# Patient Record
Sex: FEMALE | Race: BLACK OR AFRICAN AMERICAN | NOT HISPANIC OR LATINO | Employment: UNEMPLOYED | URBAN - METROPOLITAN AREA
[De-identification: names, ages, dates, MRNs, and addresses within clinical notes are randomized per-mention and may not be internally consistent; named-entity substitution may affect disease eponyms.]

---

## 2024-10-08 ENCOUNTER — HOSPITAL ENCOUNTER (EMERGENCY)
Facility: HOSPITAL | Age: 34
Discharge: HOME | End: 2024-10-08
Attending: STUDENT IN AN ORGANIZED HEALTH CARE EDUCATION/TRAINING PROGRAM
Payer: MEDICAID

## 2024-10-08 VITALS
RESPIRATION RATE: 18 BRPM | HEART RATE: 92 BPM | WEIGHT: 110 LBS | TEMPERATURE: 98 F | SYSTOLIC BLOOD PRESSURE: 110 MMHG | BODY MASS INDEX: 22.18 KG/M2 | OXYGEN SATURATION: 99 % | HEIGHT: 59 IN | DIASTOLIC BLOOD PRESSURE: 63 MMHG

## 2024-10-08 DIAGNOSIS — R42 DIZZINESS: Primary | ICD-10-CM

## 2024-10-08 DIAGNOSIS — R42 VERTIGO: ICD-10-CM

## 2024-10-08 DIAGNOSIS — N30.00 ACUTE CYSTITIS WITHOUT HEMATURIA: ICD-10-CM

## 2024-10-08 LAB
ALBUMIN SERPL-MCNC: 3.6 G/DL (ref 3.5–5)
ALBUMIN/GLOB SERPL: 1.1 RATIO (ref 1.1–2)
ALP SERPL-CCNC: 62 UNIT/L (ref 40–150)
ALT SERPL-CCNC: 12 UNIT/L (ref 0–55)
ANION GAP SERPL CALC-SCNC: 8 MEQ/L
AST SERPL-CCNC: 22 UNIT/L (ref 5–34)
B-HCG UR QL: NEGATIVE
BACTERIA #/AREA URNS AUTO: ABNORMAL /HPF
BASOPHILS # BLD AUTO: 0.01 X10(3)/MCL
BASOPHILS NFR BLD AUTO: 0.2 %
BILIRUB SERPL-MCNC: 0.5 MG/DL
BILIRUB UR QL STRIP.AUTO: NEGATIVE
BUN SERPL-MCNC: 11.2 MG/DL (ref 7–18.7)
CALCIUM SERPL-MCNC: 8.7 MG/DL (ref 8.4–10.2)
CHLORIDE SERPL-SCNC: 107 MMOL/L (ref 98–107)
CLARITY UR: ABNORMAL
CO2 SERPL-SCNC: 25 MMOL/L (ref 22–29)
COLOR UR AUTO: YELLOW
CREAT SERPL-MCNC: 0.84 MG/DL (ref 0.55–1.02)
CREAT/UREA NIT SERPL: 13
EOSINOPHIL # BLD AUTO: 0.02 X10(3)/MCL (ref 0–0.9)
EOSINOPHIL NFR BLD AUTO: 0.4 %
EPI CELLS #/AREA URNS HPF: ABNORMAL /HPF
ERYTHROCYTE [DISTWIDTH] IN BLOOD BY AUTOMATED COUNT: 12.5 % (ref 11.5–17)
GFR SERPLBLD CREATININE-BSD FMLA CKD-EPI: >60 ML/MIN/1.73/M2
GLOBULIN SER-MCNC: 3.3 GM/DL (ref 2.4–3.5)
GLUCOSE SERPL-MCNC: 102 MG/DL (ref 74–100)
GLUCOSE UR QL STRIP: NORMAL
HCT VFR BLD AUTO: 38 % (ref 37–47)
HGB BLD-MCNC: 12.3 G/DL (ref 12–16)
HGB UR QL STRIP: NEGATIVE
HYALINE CASTS #/AREA URNS LPF: >20 /LPF
IMM GRANULOCYTES # BLD AUTO: 0.01 X10(3)/MCL (ref 0–0.04)
IMM GRANULOCYTES NFR BLD AUTO: 0.2 %
KETONES UR QL STRIP: ABNORMAL
LEUKOCYTE ESTERASE UR QL STRIP: 25
LYMPHOCYTES # BLD AUTO: 1.17 X10(3)/MCL (ref 0.6–4.6)
LYMPHOCYTES NFR BLD AUTO: 22.6 %
MCH RBC QN AUTO: 27.8 PG (ref 27–31)
MCHC RBC AUTO-ENTMCNC: 32.4 G/DL (ref 33–36)
MCV RBC AUTO: 85.8 FL (ref 80–94)
MONOCYTES # BLD AUTO: 0.46 X10(3)/MCL (ref 0.1–1.3)
MONOCYTES NFR BLD AUTO: 8.9 %
MUCOUS THREADS URNS QL MICRO: ABNORMAL /LPF
NEUTROPHILS # BLD AUTO: 3.51 X10(3)/MCL (ref 2.1–9.2)
NEUTROPHILS NFR BLD AUTO: 67.7 %
NITRITE UR QL STRIP: NEGATIVE
NRBC BLD AUTO-RTO: 0 %
PH UR STRIP: 6 [PH]
PLATELET # BLD AUTO: 111 X10(3)/MCL (ref 130–400)
PMV BLD AUTO: 13 FL (ref 7.4–10.4)
POTASSIUM SERPL-SCNC: 3.9 MMOL/L (ref 3.5–5.1)
PROT SERPL-MCNC: 6.9 GM/DL (ref 6.4–8.3)
PROT UR QL STRIP: ABNORMAL
RBC # BLD AUTO: 4.43 X10(6)/MCL (ref 4.2–5.4)
RBC #/AREA URNS AUTO: ABNORMAL /HPF
SODIUM SERPL-SCNC: 140 MMOL/L (ref 136–145)
SP GR UR STRIP.AUTO: 1.04 (ref 1–1.03)
SQUAMOUS #/AREA URNS LPF: ABNORMAL /HPF
UROBILINOGEN UR STRIP-ACNC: 2
WBC # BLD AUTO: 5.18 X10(3)/MCL (ref 4.5–11.5)
WBC #/AREA URNS AUTO: ABNORMAL /HPF

## 2024-10-08 PROCEDURE — 80053 COMPREHEN METABOLIC PANEL: CPT | Performed by: PHYSICIAN ASSISTANT

## 2024-10-08 PROCEDURE — 81001 URINALYSIS AUTO W/SCOPE: CPT | Performed by: PHYSICIAN ASSISTANT

## 2024-10-08 PROCEDURE — 85025 COMPLETE CBC W/AUTO DIFF WBC: CPT | Performed by: PHYSICIAN ASSISTANT

## 2024-10-08 PROCEDURE — 25000003 PHARM REV CODE 250: Performed by: NURSE PRACTITIONER

## 2024-10-08 PROCEDURE — 87086 URINE CULTURE/COLONY COUNT: CPT | Performed by: PHYSICIAN ASSISTANT

## 2024-10-08 PROCEDURE — 96375 TX/PRO/DX INJ NEW DRUG ADDON: CPT

## 2024-10-08 PROCEDURE — 81025 URINE PREGNANCY TEST: CPT | Performed by: PHYSICIAN ASSISTANT

## 2024-10-08 PROCEDURE — 96374 THER/PROPH/DIAG INJ IV PUSH: CPT

## 2024-10-08 PROCEDURE — 99284 EMERGENCY DEPT VISIT MOD MDM: CPT

## 2024-10-08 PROCEDURE — 63600175 PHARM REV CODE 636 W HCPCS: Performed by: PHYSICIAN ASSISTANT

## 2024-10-08 PROCEDURE — 63600175 PHARM REV CODE 636 W HCPCS: Performed by: NURSE PRACTITIONER

## 2024-10-08 RX ORDER — DIPHENHYDRAMINE HYDROCHLORIDE 50 MG/ML
25 INJECTION INTRAMUSCULAR; INTRAVENOUS
Status: COMPLETED | OUTPATIENT
Start: 2024-10-08 | End: 2024-10-08

## 2024-10-08 RX ORDER — MECLIZINE HYDROCHLORIDE 25 MG/1
25 TABLET ORAL
Status: COMPLETED | OUTPATIENT
Start: 2024-10-08 | End: 2024-10-08

## 2024-10-08 RX ORDER — METOCLOPRAMIDE HYDROCHLORIDE 5 MG/ML
10 INJECTION INTRAMUSCULAR; INTRAVENOUS
Status: COMPLETED | OUTPATIENT
Start: 2024-10-08 | End: 2024-10-08

## 2024-10-08 RX ORDER — NITROFURANTOIN 25; 75 MG/1; MG/1
100 CAPSULE ORAL 2 TIMES DAILY
Qty: 10 CAPSULE | Refills: 0 | Status: SHIPPED | OUTPATIENT
Start: 2024-10-08 | End: 2024-10-13

## 2024-10-08 RX ORDER — PROMETHAZINE HYDROCHLORIDE 25 MG/1
25 TABLET ORAL EVERY 6 HOURS PRN
Qty: 15 TABLET | Refills: 0 | Status: SHIPPED | OUTPATIENT
Start: 2024-10-08

## 2024-10-08 RX ORDER — SODIUM CHLORIDE 9 MG/ML
500 INJECTION, SOLUTION INTRAVENOUS
Status: COMPLETED | OUTPATIENT
Start: 2024-10-08 | End: 2024-10-08

## 2024-10-08 RX ORDER — ONDANSETRON HYDROCHLORIDE 2 MG/ML
4 INJECTION, SOLUTION INTRAVENOUS
Status: COMPLETED | OUTPATIENT
Start: 2024-10-08 | End: 2024-10-08

## 2024-10-08 RX ORDER — MECLIZINE HYDROCHLORIDE 25 MG/1
25 TABLET ORAL 3 TIMES DAILY PRN
Qty: 15 TABLET | Refills: 0 | Status: SHIPPED | OUTPATIENT
Start: 2024-10-08 | End: 2024-10-13

## 2024-10-08 RX ADMIN — DIPHENHYDRAMINE HYDROCHLORIDE 25 MG: 50 INJECTION INTRAMUSCULAR; INTRAVENOUS at 03:10

## 2024-10-08 RX ADMIN — MECLIZINE HYDROCHLORIDE 25 MG: 25 TABLET ORAL at 03:10

## 2024-10-08 RX ADMIN — SODIUM CHLORIDE 500 ML: 9 INJECTION, SOLUTION INTRAVENOUS at 03:10

## 2024-10-08 RX ADMIN — METOCLOPRAMIDE 10 MG: 5 INJECTION, SOLUTION INTRAMUSCULAR; INTRAVENOUS at 03:10

## 2024-10-08 RX ADMIN — ONDANSETRON 4 MG: 2 INJECTION INTRAMUSCULAR; INTRAVENOUS at 01:10

## 2024-10-08 NOTE — DISCHARGE INSTRUCTIONS
Stay hydrated. Phenergan for nausea/vomiting as needed. Meclizine for dizziness as needed. Macrobid for urine infection. If symptoms change/worsen, return to ER

## 2024-10-08 NOTE — ED PROVIDER NOTES
Encounter Date: 10/8/2024       History     Chief Complaint   Patient presents with    Headache    Dizziness     Dizziness, n/v lower abd pain. Went to  yesterday, was told she was dehydrated. States no mensuration x6 years. Still vomiting despite zofran prescription at . Appropriate in triage     See MDM    The history is provided by the patient. No  was used.     Review of patient's allergies indicates:  No Known Allergies  No past medical history on file.  No past surgical history on file.  No family history on file.     Review of Systems   Gastrointestinal:  Positive for abdominal pain, nausea and vomiting.   Neurological:  Positive for dizziness.   All other systems reviewed and are negative.      Physical Exam     Initial Vitals   BP Pulse Resp Temp SpO2   10/08/24 1253 10/08/24 1253 10/08/24 1254 10/08/24 1253 10/08/24 1253   104/62 93 20 97.6 °F (36.4 °C) 97 %      MAP       --                Physical Exam    Nursing note and vitals reviewed.  Constitutional: She appears well-developed and well-nourished.   Appears to not feel well but not toxic    Eyes: Conjunctivae and EOM are normal. Pupils are equal, round, and reactive to light.   Neck:   Normal range of motion.  Cardiovascular:  Normal rate, regular rhythm and normal heart sounds.           Pulmonary/Chest: Breath sounds normal. No respiratory distress.   Abdominal: Abdomen is soft. Bowel sounds are normal. She exhibits no distension. There is no abdominal tenderness.   Musculoskeletal:         General: Normal range of motion.      Cervical back: Normal range of motion.      Comments: Bilateral UE and LE strength equal and strong.      Neurological: She is alert and oriented to person, place, and time.   Finger to nose bilateral normal   Skin: Skin is warm and dry.   Psychiatric: She has a normal mood and affect.         ED Course   Procedures  Labs Reviewed   COMPREHENSIVE METABOLIC PANEL - Abnormal       Result Value    Sodium  140      Potassium 3.9      Chloride 107      CO2 25      Glucose 102 (*)     Blood Urea Nitrogen 11.2      Creatinine 0.84      Calcium 8.7      Protein Total 6.9      Albumin 3.6      Globulin 3.3      Albumin/Globulin Ratio 1.1      Bilirubin Total 0.5      ALP 62      ALT 12      AST 22      eGFR >60      Anion Gap 8.0      BUN/Creatinine Ratio 13     URINALYSIS, REFLEX TO URINE CULTURE - Abnormal    Color, UA Yellow      Appearance, UA Turbid (*)     Specific Gravity, UA 1.036 (*)     pH, UA 6.0      Protein, UA 1+ (*)     Glucose, UA Normal      Ketones, UA 1+ (*)     Blood, UA Negative      Bilirubin, UA Negative      Urobilinogen, UA 2.0 (*)     Nitrites, UA Negative      Leukocyte Esterase, UA 25 (*)     RBC, UA 0-5      WBC, UA 11-20 (*)     Bacteria, UA Many (*)     Squamous Epithelial Cells, UA Few (*)     Transitional Epithelial Cells, UA Trace (*)     Mucous, UA Many (*)     Hyaline Casts, UA >20 (*)    CBC WITH DIFFERENTIAL - Abnormal    WBC 5.18      RBC 4.43      Hgb 12.3      Hct 38.0      MCV 85.8      MCH 27.8      MCHC 32.4 (*)     RDW 12.5      Platelet 111 (*)     MPV 13.0 (*)     Neut % 67.7      Lymph % 22.6      Mono % 8.9      Eos % 0.4      Basophil % 0.2      Lymph # 1.17      Neut # 3.51      Mono # 0.46      Eos # 0.02      Baso # 0.01      IG# 0.01      IG% 0.2      NRBC% 0.0     PREGNANCY TEST, URINE RAPID - Normal    hCG Qualitative, Urine Negative     CULTURE, URINE    Urine Culture No Significant Growth     CBC W/ AUTO DIFFERENTIAL    Narrative:     The following orders were created for panel order CBC auto differential.  Procedure                               Abnormality         Status                     ---------                               -----------         ------                     CBC with Differential[[id number]]       Abnormal            Final result                 Please view results for these tests on the individual orders.          Imaging Results    None           Medications   ondansetron injection 4 mg (4 mg Intravenous Given 10/8/24 1332)   metoclopramide injection 10 mg (10 mg Intravenous Given 10/8/24 154)   meclizine tablet 25 mg (25 mg Oral Given 10/8/24 154)   diphenhydrAMINE injection 25 mg (25 mg Intravenous Given 10/8/24 1547)   0.9%  NaCl infusion (500 mLs Intravenous New Bag 10/8/24 1547)     Medical Decision Making  33 y/o female presents with dizziness like the room is spinning with n/v for couple days now. No trauma. No congestion or fever. Mild intermittent abdominal pain.     Labs show no alarming findings. Fluids along with meds given tto treat dizziness and nausea/vomiting. After meds she is feeling improved. Discussed results. Symptoms and presentation certainly seem to be consistent with vertigo. Close follow up with primary care and ER precautions given.     Amount and/or Complexity of Data Reviewed  Labs:  Decision-making details documented in ED Course.    Risk  Prescription drug management.      Additional MDM:   Differential Diagnosis:   Other: The following diagnoses were also considered and will be evaluated: dehydration, vertigo and UTI.                                   Clinical Impression:  Final diagnoses:  [R42] Dizziness (Primary)  [R42] Vertigo  [N30.00] Acute cystitis without hematuria          ED Disposition Condition    Discharge Stable          ED Prescriptions       Medication Sig Dispense Start Date End Date Auth. Provider    meclizine (ANTIVERT) 25 mg tablet () Take 1 tablet (25 mg total) by mouth 3 (three) times daily as needed for Dizziness. 15 tablet 10/8/2024 10/13/2024 Donna Mccullough FNP    promethazine (PHENERGAN) 25 MG tablet Take 1 tablet (25 mg total) by mouth every 6 (six) hours as needed for Nausea. 15 tablet 10/8/2024 -- Donna Mccullough FNP    nitrofurantoin, macrocrystal-monohydrate, (MACROBID) 100 MG capsule () Take 1 capsule (100 mg total) by mouth 2 (two) times daily. for 5 days 10 capsule  10/8/2024 10/13/2024 Donna Mccullough FNP          Follow-up Information       Follow up With Specialties Details Why Contact Info    primary care provider                 Donna Mccullough FNP  10/18/24 5944

## 2024-10-08 NOTE — FIRST PROVIDER EVALUATION
Medical screening examination initiated.  I have conducted a focused provider triage encounter, findings are as follows:    Brief history of present illness:  34-year-old female presents to ED for evaluation lower abdominal pain with nausea vomiting and dizziness.  Patient reports that she went to urgent care and she had dehydration.    There were no vitals filed for this visit.    Pertinent physical exam:  Patient is awake and alert and oriented.  Ambulatory to triage.  In no acute distress.      Brief workup plan:  labs, UA, UPT    Preliminary workup initiated; this workup will be continued and followed by the physician or advanced practice provider that is assigned to the patient when roomed.

## 2024-10-10 LAB — BACTERIA UR CULT: NORMAL

## 2025-01-19 ENCOUNTER — HOSPITAL ENCOUNTER (EMERGENCY)
Facility: HOSPITAL | Age: 35
Discharge: HOME | End: 2025-01-20
Attending: EMERGENCY MEDICINE
Payer: MEDICAID

## 2025-01-19 DIAGNOSIS — N39.0 URINARY TRACT INFECTION WITHOUT HEMATURIA, SITE UNSPECIFIED: Primary | ICD-10-CM

## 2025-01-19 DIAGNOSIS — R50.9 FEVER: ICD-10-CM

## 2025-01-19 DIAGNOSIS — R79.89 ELEVATED LFTS: ICD-10-CM

## 2025-01-19 LAB
ALBUMIN SERPL-MCNC: 2 G/DL (ref 3.5–5)
ALBUMIN SERPL-MCNC: 2.8 G/DL (ref 3.5–5)
ALBUMIN/GLOB SERPL: 0.6 RATIO (ref 1.1–2)
ALBUMIN/GLOB SERPL: 0.6 RATIO (ref 1.1–2)
ALP SERPL-CCNC: 142 UNIT/L (ref 40–150)
ALP SERPL-CCNC: 190 UNIT/L (ref 40–150)
ALT SERPL-CCNC: 77 UNIT/L (ref 0–55)
ALT SERPL-CCNC: 92 UNIT/L (ref 0–55)
AMMONIA PLAS-MSCNC: 26.5 UMOL/L (ref 18–72)
ANION GAP SERPL CALC-SCNC: 10 MEQ/L
ANION GAP SERPL CALC-SCNC: 6 MEQ/L
APAP SERPL-MCNC: <3 UG/ML (ref 10–30)
AST SERPL-CCNC: 148 UNIT/L (ref 5–34)
AST SERPL-CCNC: 184 UNIT/L (ref 5–34)
B-HCG UR QL: NEGATIVE
BACTERIA #/AREA URNS AUTO: ABNORMAL /HPF
BASOPHILS # BLD AUTO: 0.02 X10(3)/MCL
BASOPHILS NFR BLD AUTO: 0.2 %
BILIRUB SERPL-MCNC: 0.8 MG/DL
BILIRUB SERPL-MCNC: 1.2 MG/DL
BILIRUB UR QL STRIP.AUTO: ABNORMAL
BUN SERPL-MCNC: 14.3 MG/DL (ref 7–18.7)
BUN SERPL-MCNC: 14.5 MG/DL (ref 7–18.7)
CALCIUM SERPL-MCNC: 7.4 MG/DL (ref 8.4–10.2)
CALCIUM SERPL-MCNC: 9.2 MG/DL (ref 8.4–10.2)
CHLORIDE SERPL-SCNC: 100 MMOL/L (ref 98–107)
CHLORIDE SERPL-SCNC: 109 MMOL/L (ref 98–107)
CLARITY UR: ABNORMAL
CO2 SERPL-SCNC: 20 MMOL/L (ref 22–29)
CO2 SERPL-SCNC: 24 MMOL/L (ref 22–29)
COLOR UR AUTO: ABNORMAL
CREAT SERPL-MCNC: 0.76 MG/DL (ref 0.55–1.02)
CREAT SERPL-MCNC: 0.79 MG/DL (ref 0.55–1.02)
CREAT/UREA NIT SERPL: 18
CREAT/UREA NIT SERPL: 19
EOSINOPHIL # BLD AUTO: 0 X10(3)/MCL (ref 0–0.9)
EOSINOPHIL NFR BLD AUTO: 0 %
ERYTHROCYTE [DISTWIDTH] IN BLOOD BY AUTOMATED COUNT: 12.7 % (ref 11.5–17)
GFR SERPLBLD CREATININE-BSD FMLA CKD-EPI: >60 ML/MIN/1.73/M2
GFR SERPLBLD CREATININE-BSD FMLA CKD-EPI: >60 ML/MIN/1.73/M2
GLOBULIN SER-MCNC: 3.3 GM/DL (ref 2.4–3.5)
GLOBULIN SER-MCNC: 4.5 GM/DL (ref 2.4–3.5)
GLUCOSE SERPL-MCNC: 115 MG/DL (ref 74–100)
GLUCOSE SERPL-MCNC: 95 MG/DL (ref 74–100)
GLUCOSE UR QL STRIP: NORMAL
HCT VFR BLD AUTO: 34.7 % (ref 37–47)
HGB BLD-MCNC: 11.6 G/DL (ref 12–16)
HGB UR QL STRIP: ABNORMAL
IMM GRANULOCYTES # BLD AUTO: 0.07 X10(3)/MCL (ref 0–0.04)
IMM GRANULOCYTES NFR BLD AUTO: 0.6 %
KETONES UR QL STRIP: NEGATIVE
LEUKOCYTE ESTERASE UR QL STRIP: 500
LYMPHOCYTES # BLD AUTO: 1.1 X10(3)/MCL (ref 0.6–4.6)
LYMPHOCYTES NFR BLD AUTO: 9.4 %
MCH RBC QN AUTO: 27.5 PG (ref 27–31)
MCHC RBC AUTO-ENTMCNC: 33.4 G/DL (ref 33–36)
MCV RBC AUTO: 82.2 FL (ref 80–94)
MONOCYTES # BLD AUTO: 1.42 X10(3)/MCL (ref 0.1–1.3)
MONOCYTES NFR BLD AUTO: 12.2 %
NEUTROPHILS # BLD AUTO: 9.05 X10(3)/MCL (ref 2.1–9.2)
NEUTROPHILS NFR BLD AUTO: 77.6 %
NITRITE UR QL STRIP: ABNORMAL
NRBC BLD AUTO-RTO: 0 %
PH UR STRIP: 6 [PH]
PLATELET # BLD AUTO: 113 X10(3)/MCL (ref 130–400)
PLATELETS.RETICULATED NFR BLD AUTO: 18.8 % (ref 0.9–11.2)
PMV BLD AUTO: ABNORMAL FL
POTASSIUM SERPL-SCNC: 3.2 MMOL/L (ref 3.5–5.1)
POTASSIUM SERPL-SCNC: 3.4 MMOL/L (ref 3.5–5.1)
PROT SERPL-MCNC: 5.3 GM/DL (ref 6.4–8.3)
PROT SERPL-MCNC: 7.3 GM/DL (ref 6.4–8.3)
PROT UR QL STRIP: ABNORMAL
RBC # BLD AUTO: 4.22 X10(6)/MCL (ref 4.2–5.4)
RBC #/AREA URNS AUTO: ABNORMAL /HPF
SODIUM SERPL-SCNC: 134 MMOL/L (ref 136–145)
SODIUM SERPL-SCNC: 135 MMOL/L (ref 136–145)
SP GR UR STRIP.AUTO: 1.02 (ref 1–1.03)
SQUAMOUS #/AREA URNS LPF: ABNORMAL /HPF
UROBILINOGEN UR STRIP-ACNC: >12
WBC # BLD AUTO: 11.66 X10(3)/MCL (ref 4.5–11.5)
WBC #/AREA URNS AUTO: >100 /HPF

## 2025-01-19 PROCEDURE — 81001 URINALYSIS AUTO W/SCOPE: CPT | Performed by: NURSE PRACTITIONER

## 2025-01-19 PROCEDURE — 87077 CULTURE AEROBIC IDENTIFY: CPT | Performed by: NURSE PRACTITIONER

## 2025-01-19 PROCEDURE — 25000003 PHARM REV CODE 250: Performed by: EMERGENCY MEDICINE

## 2025-01-19 PROCEDURE — 80053 COMPREHEN METABOLIC PANEL: CPT | Performed by: NURSE PRACTITIONER

## 2025-01-19 PROCEDURE — 63600175 PHARM REV CODE 636 W HCPCS: Performed by: EMERGENCY MEDICINE

## 2025-01-19 PROCEDURE — 80143 DRUG ASSAY ACETAMINOPHEN: CPT | Performed by: EMERGENCY MEDICINE

## 2025-01-19 PROCEDURE — 82140 ASSAY OF AMMONIA: CPT | Performed by: EMERGENCY MEDICINE

## 2025-01-19 PROCEDURE — 96374 THER/PROPH/DIAG INJ IV PUSH: CPT

## 2025-01-19 PROCEDURE — 96375 TX/PRO/DX INJ NEW DRUG ADDON: CPT

## 2025-01-19 PROCEDURE — 25000003 PHARM REV CODE 250: Performed by: NURSE PRACTITIONER

## 2025-01-19 PROCEDURE — 81025 URINE PREGNANCY TEST: CPT | Performed by: NURSE PRACTITIONER

## 2025-01-19 PROCEDURE — 96361 HYDRATE IV INFUSION ADD-ON: CPT

## 2025-01-19 PROCEDURE — 80053 COMPREHEN METABOLIC PANEL: CPT | Performed by: EMERGENCY MEDICINE

## 2025-01-19 PROCEDURE — 99285 EMERGENCY DEPT VISIT HI MDM: CPT | Mod: 25

## 2025-01-19 PROCEDURE — 85025 COMPLETE CBC W/AUTO DIFF WBC: CPT | Performed by: NURSE PRACTITIONER

## 2025-01-19 PROCEDURE — 63600175 PHARM REV CODE 636 W HCPCS: Performed by: NURSE PRACTITIONER

## 2025-01-19 RX ORDER — SODIUM CHLORIDE 9 MG/ML
1000 INJECTION, SOLUTION INTRAVENOUS
Status: COMPLETED | OUTPATIENT
Start: 2025-01-19 | End: 2025-01-19

## 2025-01-19 RX ORDER — IBUPROFEN 600 MG/1
600 TABLET ORAL
Status: COMPLETED | OUTPATIENT
Start: 2025-01-19 | End: 2025-01-19

## 2025-01-19 RX ORDER — ONDANSETRON HYDROCHLORIDE 2 MG/ML
4 INJECTION, SOLUTION INTRAVENOUS
Status: COMPLETED | OUTPATIENT
Start: 2025-01-19 | End: 2025-01-19

## 2025-01-19 RX ORDER — CEFTRIAXONE 1 G/1
1 INJECTION, POWDER, FOR SOLUTION INTRAMUSCULAR; INTRAVENOUS
Status: COMPLETED | OUTPATIENT
Start: 2025-01-19 | End: 2025-01-19

## 2025-01-19 RX ADMIN — SODIUM CHLORIDE 1497 ML: 9 INJECTION, SOLUTION INTRAVENOUS at 08:01

## 2025-01-19 RX ADMIN — CEFTRIAXONE SODIUM 1 G: 1 INJECTION, POWDER, FOR SOLUTION INTRAMUSCULAR; INTRAVENOUS at 08:01

## 2025-01-19 RX ADMIN — SODIUM CHLORIDE 1000 ML: 9 INJECTION, SOLUTION INTRAVENOUS at 04:01

## 2025-01-19 RX ADMIN — ONDANSETRON 4 MG: 2 INJECTION INTRAMUSCULAR; INTRAVENOUS at 04:01

## 2025-01-19 RX ADMIN — IBUPROFEN 600 MG: 600 TABLET, FILM COATED ORAL at 04:01

## 2025-01-19 NOTE — FIRST PROVIDER EVALUATION
Medical screening examination initiated.  I have conducted a focused provider triage encounter, findings are as follows:    Brief history of present illness:  35y/o F presents to ED with vomiting/fever. Dx with flu four days ago. States feels worse.     There were no vitals filed for this visit.    Pertinent physical exam:  AAA x 3    Brief workup plan:  Labs/meds    Preliminary workup initiated; this workup will be continued and followed by the physician or advanced practice provider that is assigned to the patient when roomed.

## 2025-01-19 NOTE — Clinical Note
"Madison Llaneskelechi" Yesica was seen and treated in our emergency department on 1/19/2025.  She may return to work on 01/23/2025.       If you have any questions or concerns, please don't hesitate to call.      Jamie RYAN    "

## 2025-01-20 VITALS
OXYGEN SATURATION: 100 % | HEART RATE: 78 BPM | DIASTOLIC BLOOD PRESSURE: 69 MMHG | TEMPERATURE: 98 F | RESPIRATION RATE: 15 BRPM | SYSTOLIC BLOOD PRESSURE: 102 MMHG | WEIGHT: 110 LBS | HEIGHT: 59 IN | BODY MASS INDEX: 22.18 KG/M2

## 2025-01-20 RX ORDER — CEFDINIR 300 MG/1
300 CAPSULE ORAL 2 TIMES DAILY
Qty: 20 CAPSULE | Refills: 0 | Status: SHIPPED | OUTPATIENT
Start: 2025-01-20 | End: 2025-01-30

## 2025-01-20 NOTE — ED PROVIDER NOTES
Encounter Date: 1/19/2025    SCRIBE #1 NOTE: I, Tho Urban, am scribing for, and in the presence of,  Masoud Barnard MD. I have scribed the following portions of the note - Other sections scribed: HPI,ROS,PE.       History     Chief Complaint   Patient presents with    Fever     Pt c/o n/v, body aches, chills, fever for days, seen by doctor, diagnosed with Flu. Pt reports no improvement with meds.      35 y/o female with no significant PMHx presents to ED c/o fever for a few days. Pt reports associated symptoms of chills, body aches, nausea, and vomiting. She reports being seen by her doctor a few days ago, was diagnosed with the flu, and prescribed tamiflu and zofran. She reports also taking tylenol and motrin with no improvement of her symptoms. She reports her last dose of tylenol was at 13:00 today.     The history is provided by the patient.     Review of patient's allergies indicates:  No Known Allergies  No past medical history on file.  No past surgical history on file.  No family history on file.     Review of Systems   Constitutional:  Positive for chills and fever.   Gastrointestinal:  Positive for nausea and vomiting.   Musculoskeletal:  Positive for arthralgias (generalized body aches) and myalgias (generalized body aches).       Physical Exam     Initial Vitals [01/19/25 1539]   BP Pulse Resp Temp SpO2   99/61 100 14 (!) 102.6 °F (39.2 °C) 99 %      MAP       --         Physical Exam    Nursing note and vitals reviewed.  Constitutional: She appears well-developed. No distress.   HENT:   Head: Normocephalic and atraumatic. Mouth/Throat: Oropharynx is clear and moist.   Eyes: Conjunctivae and EOM are normal. Pupils are equal, round, and reactive to light.   Neck: Neck supple.   Cardiovascular:  Normal rate, regular rhythm and normal heart sounds.           No murmur heard.  Pulmonary/Chest: Breath sounds normal. No respiratory distress. She exhibits no tenderness.   Abdominal: Abdomen is soft.  Bowel sounds are normal. She exhibits no distension. There is no abdominal tenderness.   Musculoskeletal:         General: Normal range of motion.      Cervical back: Neck supple.      Lumbar back: Normal. Normal range of motion.     Neurological: She is alert and oriented to person, place, and time. She has normal strength. No cranial nerve deficit or sensory deficit.   Psychiatric: She has a normal mood and affect. Judgment normal.         ED Course   Procedures  Labs Reviewed   COMPREHENSIVE METABOLIC PANEL - Abnormal       Result Value    Sodium 134 (*)     Potassium 3.4 (*)     Chloride 100      CO2 24      Glucose 115 (*)     Blood Urea Nitrogen 14.3      Creatinine 0.79      Calcium 9.2      Protein Total 7.3      Albumin 2.8 (*)     Globulin 4.5 (*)     Albumin/Globulin Ratio 0.6 (*)     Bilirubin Total 1.2       (*)     ALT 92 (*)      (*)     eGFR >60      Anion Gap 10.0      BUN/Creatinine Ratio 18     URINALYSIS, REFLEX TO URINE CULTURE - Abnormal    Color, UA Dark-Yellow      Appearance, UA Turbid (*)     Specific Gravity, UA 1.021      pH, UA 6.0      Protein, UA 2+ (*)     Glucose, UA Normal      Ketones, UA Negative      Blood, UA 1+ (*)     Bilirubin, UA 1+ (*)     Urobilinogen, UA >12.0 (*)     Nitrites, UA 2+ (*)     Leukocyte Esterase,  (*)     RBC, UA 6-10 (*)     WBC, UA >100 (*)     Bacteria, UA Many (*)     Squamous Epithelial Cells, UA Trace     CBC WITH DIFFERENTIAL - Abnormal    WBC 11.66 (*)     RBC 4.22      Hgb 11.6 (*)     Hct 34.7 (*)     MCV 82.2      MCH 27.5      MCHC 33.4      RDW 12.7      Platelet 113 (*)     MPV        IPF 18.8 (*)     Neut % 77.6      Lymph % 9.4      Mono % 12.2      Eos % 0.0      Basophil % 0.2      Imm Grans % 0.6      Neut # 9.05      Lymph # 1.10      Mono # 1.42 (*)     Eos # 0.00      Baso # 0.02      Imm Gran # 0.07 (*)     NRBC% 0.0     ACETAMINOPHEN LEVEL - Abnormal    Acetaminophen Level <3.0 (*)    COMPREHENSIVE METABOLIC PANEL  - Abnormal    Sodium 135 (*)     Potassium 3.2 (*)     Chloride 109 (*)     CO2 20 (*)     Glucose 95      Blood Urea Nitrogen 14.5      Creatinine 0.76      Calcium 7.4 (*)     Protein Total 5.3 (*)     Albumin 2.0 (*)     Globulin 3.3      Albumin/Globulin Ratio 0.6 (*)     Bilirubin Total 0.8            ALT 77 (*)      (*)     eGFR >60      Anion Gap 6.0      BUN/Creatinine Ratio 19     PREGNANCY TEST, URINE RAPID - Normal    hCG Qualitative, Urine Negative     AMMONIA - Normal    Ammonia Level 26.5     CULTURE, URINE   CBC W/ AUTO DIFFERENTIAL    Narrative:     The following orders were created for panel order CBC Auto Differential.  Procedure                               Abnormality         Status                     ---------                               -----------         ------                     CBC with Differential[[id number]]       Abnormal            Final result                 Please view results for these tests on the individual orders.          Imaging Results              US Abdomen Limited (Final result)  Result time 01/19/25 21:15:03   Procedure changed from US Abdomen Complete     Final result by Saul Waddell MD (01/19/25 21:15:03)                   Impression:      Cholelithiasis without evidence of acute cholecystitis.    Mild prominence of the common bile duct would recommend correlation with bilirubin levels.      Electronically signed by: Saul Waddell MD  Date:    01/19/2025  Time:    21:15               Narrative:    EXAMINATION:  US ABDOMEN LIMITED    CLINICAL HISTORY:  elevated LFTs;  Other specified abnormal findings of blood chemistry    COMPARISON:  None    FINDINGS:  Pancreas is incompletely visualized and abnormality is not demonstrated.  Proximal IVC appears normal however flow is not demonstrated.  Aorta shows no evidence of an aneurysm however the bifurcation is not demonstrated.    Liver is of normal size and shape no focal lesions seen    There is  cholelithiasis present without evidence of acute cholecystitis.    There is hepatopetal flow in the portal vein.  Common bile duct measures 7 mm which is borderline.    The right kidney measures 12.1 by 4.8 x 4.6 cm there are no focal lesions noted there is no hydronephrosis                                       X-Ray Chest PA And Lateral (Final result)  Result time 01/19/25 16:03:05      Final result by Sly Christian MD (01/19/25 16:03:05)                   Impression:      No acute cardiopulmonary process.      Electronically signed by: Sly Christian  Date:    01/19/2025  Time:    16:03               Narrative:    EXAMINATION:  XR CHEST PA AND LATERAL    CLINICAL HISTORY:  Fever, unspecified    TECHNIQUE:  Two views of the chest    COMPARISON:  No prior imaging available for comparison.    FINDINGS:  No focal opacification, pleural effusion, or pneumothorax.    The cardiomediastinal silhouette is within normal limits.    No acute osseous abnormality.                                       Medications   0.9% NaCl infusion (0 mLs Intravenous Stopped 1/19/25 1700)   ibuprofen tablet 600 mg (600 mg Oral Given 1/19/25 1611)   ondansetron injection 4 mg (4 mg Intravenous Given 1/19/25 1612)   cefTRIAXone injection 1 g (1 g Intravenous Given 1/19/25 2042)   sodium chloride 0.9% bolus 1,497 mL 1,497 mL (0 mLs Intravenous Stopped 1/19/25 2154)     Medical Decision Making  The differential diagnosis includes, but is not limited to, UTI, viral illness, and dehydration.     Amount and/or Complexity of Data Reviewed  Labs: ordered. Decision-making details documented in ED Course.  Radiology: ordered. Decision-making details documented in ED Course.    Risk  Prescription drug management.            Scribe Attestation:   Scribe #1: I performed the above scribed service and the documentation accurately describes the services I performed. I attest to the accuracy of the note.    Attending Attestation:           Physician  Attestation for Scribe:  Physician Attestation Statement for Scribe #1: I, Masoud Barnard MD, reviewed documentation, as scribed by Tho Urban in my presence, and it is both accurate and complete.             ED Course as of 01/20/25 0038 Mon Jan 20, 2025 0032 I told the patient about her elevated LFTs they do seem to be going down after some IV fluids.  Ultrasound showed some gallstones I did tell her about that too but she has no direct evidence of cholecystitis or T bili was normal and she is not tender in the right upper quadrant.  Told her that she needs to follow up with the surgeon and modify her diet no fatty foods.  Come back if she has any right upper quadrant pain.  Patient has parent flu and urinary tract infection we will send her home with some antibiotics. [NL]      ED Course User Index  [NL] Masoud Barnard MD                           Clinical Impression:  Final diagnoses:  [R50.9] Fever  [R79.89] Elevated LFTs  [N39.0] Urinary tract infection without hematuria, site unspecified (Primary)          ED Disposition Condition    Discharge Stable          ED Prescriptions       Medication Sig Dispense Start Date End Date Auth. Provider    cefdinir (OMNICEF) 300 MG capsule Take 1 capsule (300 mg total) by mouth 2 (two) times daily. for 10 days 20 capsule 1/20/2025 1/30/2025 Masoud Barnard MD          Follow-up Information       Follow up With Specialties Details Why Contact Info    PCP  Call in 1 day  follow up with PCP ni 1-2 days             Masoud Barnard MD  01/20/25 0038

## 2025-01-20 NOTE — DISCHARGE INSTRUCTIONS
Drink plenty of fluids avoid Tylenol and alcohol and please follow up with your primary care doctor to repeat your liver function testing in a week.  Avoid fatty foods come back if you have severe pain or fever.  Take all antibiotics as directed.    Thanks for letting us take care of you today!  It is our goal to give you courteous care and to keep you comfortable and informed, if you have any questions before you leave I will be happy to try and answer them.    Here is some advice after your visit:      Your visit in the emergency department is NOT definitive care - please follow-up with your primary care doctor and/or specialist within 1-2 days.  Please return if you have any worsening in your condition or if you have any other concerns.    If you had radiology exams like an XRAY or CT in the emergency Department the interpreation on them may be preliminary - there may be less time sensitive findings on the reports please obtain these reports within 24 hours from the hospital or by using your out on your mobile phone to access records.  Bring these to your primary care doctor and/or specialist for further review of incidental findings.    Please review any LAB WORK from your visit today with your primary care physician.    If you were prescribed OPIATE PAIN MEDICATION - please understand of these medications can be addictive, you may fill less of the prescription was written for, you do not have to take the full prescription.  You may discard what you do not use.  Please seek help if you feel you are having problems with addiction.  Do not drive or operate heavy machinery if you are taking sedating medications.  Do not mix these medications with alcohol.      If you had a SPLINT placed in the emergency department if you have severe pain numbness tingling or discoloration of year digits please remove the splint and return to the emergency department for further evaluation as this may represent a sign of compromise  to the nerves or blood vessels due to swelling.    If you had SUTURES in the emergency department please have them removed in the prescribed time frame typically within 7-14 days.  You may shower but please do not bathe or swim.  Keep the wounds clean and dry and covered with a clean dressing.  Please return if he have any signs of infection like redness or drainage or pain at the suture site.    Please take the full course of  any ANTIBIOTICS you were prescribed - incomplete courses of antibiotics can cause resistance to antibiotics in the future which will make it difficult to treat any infections you may have.

## 2025-01-22 LAB — BACTERIA UR CULT: ABNORMAL

## 2025-03-06 ENCOUNTER — HOSPITAL ENCOUNTER (INPATIENT)
Facility: HOSPITAL | Age: 35
LOS: 3 days | Discharge: HOME | DRG: 690 | End: 2025-03-09
Attending: STUDENT IN AN ORGANIZED HEALTH CARE EDUCATION/TRAINING PROGRAM | Admitting: INTERNAL MEDICINE
Payer: MEDICAID

## 2025-03-06 DIAGNOSIS — R07.9 CHEST PAIN: ICD-10-CM

## 2025-03-06 DIAGNOSIS — Z13.6 SCREENING FOR CARDIOVASCULAR CONDITION: ICD-10-CM

## 2025-03-06 DIAGNOSIS — N12 PYELONEPHRITIS OF RIGHT KIDNEY: Primary | ICD-10-CM

## 2025-03-06 DIAGNOSIS — K80.20 CALCULUS OF GALLBLADDER WITHOUT CHOLECYSTITIS WITHOUT OBSTRUCTION: ICD-10-CM

## 2025-03-06 LAB
ALBUMIN SERPL-MCNC: 3.2 G/DL (ref 3.5–5)
ALBUMIN/GLOB SERPL: 0.7 RATIO (ref 1.1–2)
ALP SERPL-CCNC: 77 UNIT/L (ref 40–150)
ALT SERPL-CCNC: 9 UNIT/L (ref 0–55)
ANION GAP SERPL CALC-SCNC: 10 MEQ/L
AST SERPL-CCNC: 16 UNIT/L (ref 5–34)
B-HCG UR QL: NEGATIVE
BACTERIA #/AREA URNS AUTO: ABNORMAL /HPF
BASOPHILS # BLD AUTO: 0.01 X10(3)/MCL
BASOPHILS NFR BLD AUTO: 0.1 %
BILIRUB SERPL-MCNC: 0.4 MG/DL
BILIRUB UR QL STRIP.AUTO: NEGATIVE
BUN SERPL-MCNC: 11.1 MG/DL (ref 7–18.7)
CALCIUM SERPL-MCNC: 9.2 MG/DL (ref 8.4–10.2)
CHLORIDE SERPL-SCNC: 102 MMOL/L (ref 98–107)
CLARITY UR: ABNORMAL
CO2 SERPL-SCNC: 24 MMOL/L (ref 22–29)
COLOR UR AUTO: YELLOW
CREAT SERPL-MCNC: 0.74 MG/DL (ref 0.55–1.02)
CREAT/UREA NIT SERPL: 15
EOSINOPHIL # BLD AUTO: 0 X10(3)/MCL (ref 0–0.9)
EOSINOPHIL NFR BLD AUTO: 0 %
ERYTHROCYTE [DISTWIDTH] IN BLOOD BY AUTOMATED COUNT: 12.9 % (ref 11.5–17)
FLUAV AG UPPER RESP QL IA.RAPID: NOT DETECTED
FLUBV AG UPPER RESP QL IA.RAPID: NOT DETECTED
GFR SERPLBLD CREATININE-BSD FMLA CKD-EPI: >60 ML/MIN/1.73/M2
GLOBULIN SER-MCNC: 4.3 GM/DL (ref 2.4–3.5)
GLUCOSE SERPL-MCNC: 110 MG/DL (ref 74–100)
GLUCOSE UR QL STRIP: NORMAL
HCT VFR BLD AUTO: 33.1 % (ref 37–47)
HGB BLD-MCNC: 11 G/DL (ref 12–16)
HGB UR QL STRIP: ABNORMAL
IMM GRANULOCYTES # BLD AUTO: 0.03 X10(3)/MCL (ref 0–0.04)
IMM GRANULOCYTES NFR BLD AUTO: 0.4 %
KETONES UR QL STRIP: NEGATIVE
LEUKOCYTE ESTERASE UR QL STRIP: 250
LIPASE SERPL-CCNC: 11 U/L
LYMPHOCYTES # BLD AUTO: 1.19 X10(3)/MCL (ref 0.6–4.6)
LYMPHOCYTES NFR BLD AUTO: 15.9 %
MCH RBC QN AUTO: 27.6 PG (ref 27–31)
MCHC RBC AUTO-ENTMCNC: 33.2 G/DL (ref 33–36)
MCV RBC AUTO: 83 FL (ref 80–94)
MONOCYTES # BLD AUTO: 0.85 X10(3)/MCL (ref 0.1–1.3)
MONOCYTES NFR BLD AUTO: 11.3 %
MUCOUS THREADS URNS QL MICRO: ABNORMAL /LPF
NEUTROPHILS # BLD AUTO: 5.41 X10(3)/MCL (ref 2.1–9.2)
NEUTROPHILS NFR BLD AUTO: 72.3 %
NITRITE UR QL STRIP: ABNORMAL
NRBC BLD AUTO-RTO: 0 %
PH UR STRIP: 6 [PH]
PLATELET # BLD AUTO: 128 X10(3)/MCL (ref 130–400)
PLATELETS.RETICULATED NFR BLD AUTO: 13.7 % (ref 0.9–11.2)
PMV BLD AUTO: 13.2 FL (ref 7.4–10.4)
POTASSIUM SERPL-SCNC: 3.3 MMOL/L (ref 3.5–5.1)
PROT SERPL-MCNC: 7.5 GM/DL (ref 6.4–8.3)
PROT UR QL STRIP: ABNORMAL
RBC # BLD AUTO: 3.99 X10(6)/MCL (ref 4.2–5.4)
RBC #/AREA URNS AUTO: ABNORMAL /HPF
SARS-COV-2 RNA RESP QL NAA+PROBE: NOT DETECTED
SODIUM SERPL-SCNC: 136 MMOL/L (ref 136–145)
SP GR UR STRIP.AUTO: 1.02 (ref 1–1.03)
SQUAMOUS #/AREA URNS LPF: ABNORMAL /HPF
UROBILINOGEN UR STRIP-ACNC: NORMAL
WBC # BLD AUTO: 7.49 X10(3)/MCL (ref 4.5–11.5)
WBC #/AREA URNS AUTO: ABNORMAL /HPF

## 2025-03-06 PROCEDURE — 63600175 PHARM REV CODE 636 W HCPCS: Performed by: NURSE PRACTITIONER

## 2025-03-06 PROCEDURE — 99285 EMERGENCY DEPT VISIT HI MDM: CPT | Mod: 25

## 2025-03-06 PROCEDURE — 25000003 PHARM REV CODE 250

## 2025-03-06 PROCEDURE — 11000001 HC ACUTE MED/SURG PRIVATE ROOM

## 2025-03-06 PROCEDURE — 96375 TX/PRO/DX INJ NEW DRUG ADDON: CPT

## 2025-03-06 PROCEDURE — 96374 THER/PROPH/DIAG INJ IV PUSH: CPT

## 2025-03-06 PROCEDURE — 87086 URINE CULTURE/COLONY COUNT: CPT | Performed by: NURSE PRACTITIONER

## 2025-03-06 PROCEDURE — 63600175 PHARM REV CODE 636 W HCPCS: Mod: JZ,TB

## 2025-03-06 PROCEDURE — 25500020 PHARM REV CODE 255

## 2025-03-06 PROCEDURE — 0240U COVID/FLU A&B PCR: CPT

## 2025-03-06 PROCEDURE — 85025 COMPLETE CBC W/AUTO DIFF WBC: CPT | Performed by: NURSE PRACTITIONER

## 2025-03-06 PROCEDURE — 96361 HYDRATE IV INFUSION ADD-ON: CPT

## 2025-03-06 PROCEDURE — 25000003 PHARM REV CODE 250: Performed by: NURSE PRACTITIONER

## 2025-03-06 PROCEDURE — 81001 URINALYSIS AUTO W/SCOPE: CPT | Performed by: NURSE PRACTITIONER

## 2025-03-06 PROCEDURE — 80053 COMPREHEN METABOLIC PANEL: CPT | Performed by: NURSE PRACTITIONER

## 2025-03-06 PROCEDURE — 25000003 PHARM REV CODE 250: Performed by: STUDENT IN AN ORGANIZED HEALTH CARE EDUCATION/TRAINING PROGRAM

## 2025-03-06 PROCEDURE — 81025 URINE PREGNANCY TEST: CPT | Performed by: NURSE PRACTITIONER

## 2025-03-06 PROCEDURE — 83690 ASSAY OF LIPASE: CPT | Performed by: NURSE PRACTITIONER

## 2025-03-06 RX ORDER — IBUPROFEN 200 MG
16 TABLET ORAL
Status: DISCONTINUED | OUTPATIENT
Start: 2025-03-06 | End: 2025-03-09 | Stop reason: HOSPADM

## 2025-03-06 RX ORDER — BISACODYL 10 MG/1
10 SUPPOSITORY RECTAL DAILY PRN
Status: DISCONTINUED | OUTPATIENT
Start: 2025-03-06 | End: 2025-03-09 | Stop reason: HOSPADM

## 2025-03-06 RX ORDER — SODIUM CHLORIDE 0.9 % (FLUSH) 0.9 %
10 SYRINGE (ML) INJECTION
Status: DISCONTINUED | OUTPATIENT
Start: 2025-03-06 | End: 2025-03-09 | Stop reason: HOSPADM

## 2025-03-06 RX ORDER — POTASSIUM CHLORIDE 20 MEQ/1
40 TABLET, EXTENDED RELEASE ORAL ONCE
Status: COMPLETED | OUTPATIENT
Start: 2025-03-06 | End: 2025-03-06

## 2025-03-06 RX ORDER — PROCHLORPERAZINE EDISYLATE 5 MG/ML
5 INJECTION INTRAMUSCULAR; INTRAVENOUS EVERY 6 HOURS PRN
Status: DISCONTINUED | OUTPATIENT
Start: 2025-03-06 | End: 2025-03-09 | Stop reason: HOSPADM

## 2025-03-06 RX ORDER — ONDANSETRON HYDROCHLORIDE 2 MG/ML
4 INJECTION, SOLUTION INTRAVENOUS
Status: COMPLETED | OUTPATIENT
Start: 2025-03-06 | End: 2025-03-06

## 2025-03-06 RX ORDER — TALC
6 POWDER (GRAM) TOPICAL NIGHTLY PRN
Status: DISCONTINUED | OUTPATIENT
Start: 2025-03-06 | End: 2025-03-09 | Stop reason: HOSPADM

## 2025-03-06 RX ORDER — SODIUM CHLORIDE, SODIUM LACTATE, POTASSIUM CHLORIDE, CALCIUM CHLORIDE 600; 310; 30; 20 MG/100ML; MG/100ML; MG/100ML; MG/100ML
INJECTION, SOLUTION INTRAVENOUS CONTINUOUS
Status: ACTIVE | OUTPATIENT
Start: 2025-03-06 | End: 2025-03-07

## 2025-03-06 RX ORDER — ONDANSETRON HYDROCHLORIDE 2 MG/ML
4 INJECTION, SOLUTION INTRAVENOUS EVERY 4 HOURS PRN
Status: DISCONTINUED | OUTPATIENT
Start: 2025-03-06 | End: 2025-03-09 | Stop reason: HOSPADM

## 2025-03-06 RX ORDER — IBUPROFEN 200 MG
24 TABLET ORAL
Status: DISCONTINUED | OUTPATIENT
Start: 2025-03-06 | End: 2025-03-09 | Stop reason: HOSPADM

## 2025-03-06 RX ORDER — CEFTRIAXONE 1 G/1
1 INJECTION, POWDER, FOR SOLUTION INTRAMUSCULAR; INTRAVENOUS
Status: COMPLETED | OUTPATIENT
Start: 2025-03-06 | End: 2025-03-06

## 2025-03-06 RX ORDER — ALUMINUM HYDROXIDE, MAGNESIUM HYDROXIDE, AND SIMETHICONE 1200; 120; 1200 MG/30ML; MG/30ML; MG/30ML
30 SUSPENSION ORAL 4 TIMES DAILY PRN
Status: DISCONTINUED | OUTPATIENT
Start: 2025-03-06 | End: 2025-03-09 | Stop reason: HOSPADM

## 2025-03-06 RX ORDER — KETOROLAC TROMETHAMINE 30 MG/ML
15 INJECTION, SOLUTION INTRAMUSCULAR; INTRAVENOUS
Status: COMPLETED | OUTPATIENT
Start: 2025-03-06 | End: 2025-03-06

## 2025-03-06 RX ORDER — MORPHINE SULFATE 4 MG/ML
2 INJECTION, SOLUTION INTRAMUSCULAR; INTRAVENOUS
Status: COMPLETED | OUTPATIENT
Start: 2025-03-06 | End: 2025-03-06

## 2025-03-06 RX ORDER — AMOXICILLIN 250 MG
1 CAPSULE ORAL 2 TIMES DAILY PRN
Status: DISCONTINUED | OUTPATIENT
Start: 2025-03-06 | End: 2025-03-09 | Stop reason: HOSPADM

## 2025-03-06 RX ORDER — ACETAMINOPHEN 500 MG
1000 TABLET ORAL EVERY 6 HOURS PRN
Status: DISCONTINUED | OUTPATIENT
Start: 2025-03-06 | End: 2025-03-09 | Stop reason: HOSPADM

## 2025-03-06 RX ORDER — GLUCAGON 1 MG
1 KIT INJECTION
Status: DISCONTINUED | OUTPATIENT
Start: 2025-03-06 | End: 2025-03-09 | Stop reason: HOSPADM

## 2025-03-06 RX ORDER — NALOXONE HCL 0.4 MG/ML
0.02 VIAL (ML) INJECTION
Status: DISCONTINUED | OUTPATIENT
Start: 2025-03-06 | End: 2025-03-09 | Stop reason: HOSPADM

## 2025-03-06 RX ORDER — KETOROLAC TROMETHAMINE 30 MG/ML
15 INJECTION, SOLUTION INTRAMUSCULAR; INTRAVENOUS EVERY 6 HOURS PRN
Status: DISCONTINUED | OUTPATIENT
Start: 2025-03-06 | End: 2025-03-09 | Stop reason: HOSPADM

## 2025-03-06 RX ORDER — CEFTRIAXONE 1 G/1
1 INJECTION, POWDER, FOR SOLUTION INTRAMUSCULAR; INTRAVENOUS
Status: DISCONTINUED | OUTPATIENT
Start: 2025-03-07 | End: 2025-03-06

## 2025-03-06 RX ORDER — CEFTRIAXONE 1 G/1
1 INJECTION, POWDER, FOR SOLUTION INTRAMUSCULAR; INTRAVENOUS
Status: DISCONTINUED | OUTPATIENT
Start: 2025-03-07 | End: 2025-03-09 | Stop reason: HOSPADM

## 2025-03-06 RX ADMIN — ONDANSETRON 4 MG: 2 INJECTION INTRAMUSCULAR; INTRAVENOUS at 02:03

## 2025-03-06 RX ADMIN — MORPHINE SULFATE 2 MG: 4 INJECTION INTRAVENOUS at 06:03

## 2025-03-06 RX ADMIN — SODIUM CHLORIDE 1000 ML: 9 INJECTION, SOLUTION INTRAVENOUS at 02:03

## 2025-03-06 RX ADMIN — SODIUM CHLORIDE, POTASSIUM CHLORIDE, SODIUM LACTATE AND CALCIUM CHLORIDE: 600; 310; 30; 20 INJECTION, SOLUTION INTRAVENOUS at 06:03

## 2025-03-06 RX ADMIN — POTASSIUM CHLORIDE 40 MEQ: 1500 TABLET, EXTENDED RELEASE ORAL at 10:03

## 2025-03-06 RX ADMIN — KETOROLAC TROMETHAMINE 15 MG: 30 INJECTION, SOLUTION INTRAMUSCULAR; INTRAVENOUS at 02:03

## 2025-03-06 RX ADMIN — SODIUM CHLORIDE, POTASSIUM CHLORIDE, SODIUM LACTATE AND CALCIUM CHLORIDE: 600; 310; 30; 20 INJECTION, SOLUTION INTRAVENOUS at 11:03

## 2025-03-06 RX ADMIN — IOHEXOL 100 ML: 350 INJECTION, SOLUTION INTRAVENOUS at 03:03

## 2025-03-06 RX ADMIN — SODIUM CHLORIDE 1000 ML: 9 INJECTION, SOLUTION INTRAVENOUS at 03:03

## 2025-03-06 RX ADMIN — CEFTRIAXONE SODIUM 1 G: 1 INJECTION, POWDER, FOR SOLUTION INTRAMUSCULAR; INTRAVENOUS at 03:03

## 2025-03-06 RX ADMIN — POTASSIUM CHLORIDE 40 MEQ: 1500 TABLET, EXTENDED RELEASE ORAL at 06:03

## 2025-03-06 NOTE — ED NOTES
Pt. C/O right upper abd pain started x2 days ago with fever and body aches started about 1 week ago. Reports recent hospital visit and tx for UTI and also told she had gallstones. Reports otc medications with no relief. Denies taking any prescribed medications or having any medical problems at this time. Will continue to monitor

## 2025-03-06 NOTE — CONSULTS
Acute Care Surgery   Consult    Patient Name: Madison Robert  YOB: 1990  Date: 03/06/2025 5:02 PM  Date of Admission: 3/6/2025  HD#0  POD#* No surgery found *    PRESENTING HISTORY   Chief Complaint/Reason for Admission: <principal problem not specified>  History source(s): patient  History of Present Illness:  34F w/ no significant PMH here w/ several days of subjective fever/chills, body-aches, and nausea starting Monday (3/3) w/ abdominal pain and decreased oral intake starting yesterday (3/5). General surgery was consulted for RUQ pain. She was recently seen by urgent care w/ negative flu and covid tests and given tamiflu. Her abdominal pain lasts for about an hour at a time and occurs x3-4 times a day. She has never had this pain before. She also has right upper back pain but no dysuria or loin to groin radiating pain. No other symptoms and no chest pain, shortness of breath, constipation, or diarrhea. Of note, she says that she gets frequent UTI's, about x4 a year.    She does not have any significant medical history and does not take any medications. She has never smoked and does not drink alcohol, no other drug use. Surgical history consists of 2 C-sections through a pfannenstiel incision. No allergies.    Review of Systems:  12 point ROS negative except as stated in HPI    PAST HISTORY:   Past medical history:  No past medical history on file.    Past surgical history:  No past surgical history on file.    Family history:  No family history on file.    Social history:  Social History     Socioeconomic History    Marital status: Single     Tobacco Use History[1]   Social History     Substance and Sexual Activity   Alcohol Use Not on file        MEDICATIONS & ALLERGIES:     No current facility-administered medications on file prior to encounter.     Current Outpatient Medications on File Prior to Encounter   Medication Sig    meclizine (ANTIVERT) 25 mg tablet Take 1 tablet (25 mg total)  "by mouth 3 (three) times daily as needed for Dizziness.    promethazine (PHENERGAN) 25 MG tablet Take 1 tablet (25 mg total) by mouth every 6 (six) hours as needed for Nausea.     Allergies: Review of patient's allergies indicates:  No Known Allergies  Scheduled Meds:  Continuous Infusions:  PRN Meds:    OBJECTIVE:   Vital Signs:  VITAL SIGNS: 24 HR MIN & MAX LAST   Temp  Min: 99.8 °F (37.7 °C)  Max: 99.8 °F (37.7 °C)  99.8 °F (37.7 °C)   BP  Min: 88/54  Max: 105/64  104/73    Pulse  Min: 90  Max: 107  90    Resp  Min: 16  Max: 24  (!) 24    SpO2  Min: 94 %  Max: 100 %  (!) 94 %      HT:    WT: 49.9 kg (110 lb)  BMI:       Intake/output:  Intake/Output - Last 3 Shifts       None          No intake or output data in the 24 hours ending 03/06/25 1718      Physical Exam:  General: Well developed, well nourished, no acute distress  HEENT: Normocephalic, PERRL  CV: RR  Resp: NWOB  GI:  Abdomen soft, TTP in RUQ, non-distended, no guarding, no rebound, Santos (-)  :  tender to percussion of right upper back  MSK: No muscle atrophy, cyanosis, peripheral edema, moving all extremities spontaneously  Skin/wounds:  No rashes, ulcers, erythema  Neuro: alert and oriented to person, place, and time    Labs:  K 3.3, Tbili normal, LFT's normal    Troponin:  No results for input(s): "TROPONINI" in the last 72 hours.  CBC:  Recent Labs     03/06/25  1349   WBC 7.49   RBC 3.99*   HGB 11.0*   HCT 33.1*   *   MCV 83.0   MCH 27.6   MCHC 33.2     CMP:  Recent Labs     03/06/25  1349   CALCIUM 9.2   ALBUMIN 3.2*      K 3.3*   CO2 24      BUN 11.1   CREATININE 0.74   ALKPHOS 77   ALT 9   AST 16   BILITOT 0.4     Lactic Acid:  No results for input(s): "LACTATE" in the last 72 hours.  ETOH:  No results for input(s): "ETHANOL" in the last 72 hours.   Urine Drug Screen:  No results for input(s): "COCAINE", "OPIATE", "BARBITURATE", "AMPHETAMINE", "FENTANYL", "CANNABINOIDS", "MDMA" in the last 72 hours.    Invalid input(s): " ""BENZODIAZEPINE", "PHENCYCLIDINE"   ABG:  No results for input(s): "PH", "PO2", "PCO2", "HCO3", "BE" in the last 168 hours.   I have reviewed all pertinent lab results within the past 24 hours.    Diagnostic Results:  Imaging Results              CT Abdomen Pelvis With IV Contrast NO Oral Contrast (Final result)  Result time 03/06/25 15:22:14      Final result by Ryan Alberto MD (03/06/25 15:22:14)                   Impression:      Findings compatible with pyelonephritis right kidney.      Electronically signed by: Ryan Alberto  Date:    03/06/2025  Time:    15:22               Narrative:    EXAMINATION:  CT ABDOMEN PELVIS WITH IV CONTRAST    CLINICAL HISTORY:  Nausea/vomiting;Abdominal pain, acute, nonlocalized;    TECHNIQUE:  Helical acquisition through the abdomen and pelvis with IV contrast.  Three plane reconstructions were provided for review.  mGycm. Automatic exposure control, adjustment of mA/kV or iterative reconstruction technique was used to reduce radiation.    COMPARISON:  No prior CT    FINDINGS:  The limited imaged lung bases are clear.    There is no significant abnormality of the liver.  There are gallstones.  There is no pericholecystic inflammation.  No significant abnormality of the spleen, pancreas or adrenals.    There are nephrogram defects right kidney.  No hydronephrosis.  There is some urothelial thickening on the right.  There is bladder wall thickening.    No bowel obstruction. No significant inflammatory changes of the bowel.  Normal appendix.  No free air.    Trace pelvic free fluid could be physiologic.  Abdominal aorta is normal in caliber.    No acute osseous findings.                                       US Abdomen Limited (Final result)  Result time 03/06/25 14:51:52      Final result by Saul Waddell MD (03/06/25 14:51:52)                   Impression:      Cholelithiasis without evidence of acute cholecystitis.      Electronically signed by: Saul Waddell, " MD  Date:    03/06/2025  Time:    14:51               Narrative:    EXAMINATION:  US ABDOMEN LIMITED    CLINICAL HISTORY:  RUQ abdominal pain;    COMPARISON:  01/19/2025    FINDINGS:  Proximal IVC appears normal however flow is not demonstrated.  The pancreas is incompletely visualized due to body habitus and overlying bowel gas.  An abnormality is not demonstrated.    Liver is of normal size and shape no focal lesions are demonstrated.  There are multiple cholelithiasis present without evidence of acute cholecystitis.    There is hepatopetal flow in the portal vein.  Common bile duct measures 3 mm.    The right kidney measures 10.1 x 4 by 4.5 cm there are no focal lesions noted there is no hydronephrosis.                                     I have reviewed all pertinent imaging results/findings within the past 24 hours.    ASSESSMENT & PLAN:    34F w/ no significant PMH here w/ several days of subjective fever/chills, body-aches, and nausea starting Monday (3/3) w/ abdominal pain and decreased oral intake starting yesterday (3/5). General surgery was consulted for RUQ pain.     - CT concerning for pyelonephritis but also showing cholelithiasis w/o cholecystitis  - US also showing cholelithiasis w/o cholecystitis  - no acute surgical intervention  - recommend further evaluation for treatment of pyelonephritis  - general surgery will continue to follow for serial abdominal exams      Yuri Jameson MD  [employer] General Surgery PGY1        [1]   Social History  Tobacco Use   Smoking Status Not on file   Smokeless Tobacco Not on file

## 2025-03-06 NOTE — ED PROVIDER NOTES
Encounter Date: 3/6/2025       History     Chief Complaint   Patient presents with    Abdominal Pain     Pt c/o RUQ abd pain since yesterday and nausea, fever, and chills since Monday. Denies diarrhea. Went to  Monday and tested negative for covid and flu. Hx of gallstones.     The patient is a 34 y.o. female who presents to the Emergency Department with a chief complaint of RUQ abdominal pain. Patient states she Symptoms began yesterday and have been constant since onset. Her pain is currently rated as a 6/10 in severity and described as aching with no radiation. Associated symptoms include nausea and chills. Symptoms are aggravated with nothing and there are no alleviating factors. The patient denies vomiting or diarrhea. She reports taking nothing prior to arrival with no relief of symptoms. No other reported symptoms at this time.      The history is provided by the patient. No  was used.   Abdominal Pain  The current episode started yesterday. The onset of the illness was gradual. The problem has not changed since onset.The abdominal pain is located in the RUQ. The severity of the abdominal pain is 6/10. The abdominal pain is relieved by nothing. The other symptoms of the illness include nausea. The other symptoms of the illness do not include fever, shortness of breath, vomiting, diarrhea or dysuria.   Nausea began yesterday.   The patient states that she believes she is currently not pregnant. The patient has not had a change in bowel habit. Symptoms associated with the illness do not include chills, urgency, frequency or back pain.     Review of patient's allergies indicates:  No Known Allergies  No past medical history on file.  No past surgical history on file.  No family history on file.  Social History[1]  Review of Systems   Constitutional:  Negative for chills and fever.   HENT:  Negative for sore throat.    Respiratory:  Negative for shortness of breath.    Cardiovascular:   Negative for chest pain.   Gastrointestinal:  Positive for abdominal pain and nausea. Negative for diarrhea and vomiting.   Genitourinary:  Negative for dysuria, frequency and urgency.   Musculoskeletal:  Negative for back pain.   Skin:  Negative for rash.   Neurological:  Negative for weakness.   Hematological:  Does not bruise/bleed easily.   All other systems reviewed and are negative.      Physical Exam     Initial Vitals [03/06/25 1327]   BP Pulse Resp Temp SpO2   (!) 88/54 107 18 99.8 °F (37.7 °C) 100 %      MAP       --         Physical Exam    Nursing note and vitals reviewed.  Constitutional: She appears well-developed and well-nourished.   HENT:   Head: Normocephalic.   Right Ear: Hearing and tympanic membrane normal.   Left Ear: Hearing and tympanic membrane normal. Mouth/Throat: Uvula is midline, oropharynx is clear and moist and mucous membranes are normal.   Eyes: Conjunctivae and EOM are normal. Pupils are equal, round, and reactive to light.   Cardiovascular:  Normal rate, regular rhythm, normal heart sounds and normal pulses.           Pulmonary/Chest: Effort normal and breath sounds normal.   Abdominal: Abdomen is soft. Bowel sounds are normal. There is abdominal tenderness in the right upper quadrant, epigastric area and left upper quadrant.     Lymphadenopathy:     She has no cervical adenopathy.   Neurological: She is alert. GCS eye subscore is 4. GCS verbal subscore is 5. GCS motor subscore is 6.   Skin: Skin is warm, dry and intact. Capillary refill takes less than 2 seconds.         ED Course   Procedures  Labs Reviewed   COMPREHENSIVE METABOLIC PANEL - Abnormal       Result Value    Sodium 136      Potassium 3.3 (*)     Chloride 102      CO2 24      Glucose 110 (*)     Blood Urea Nitrogen 11.1      Creatinine 0.74      Calcium 9.2      Protein Total 7.5      Albumin 3.2 (*)     Globulin 4.3 (*)     Albumin/Globulin Ratio 0.7 (*)     Bilirubin Total 0.4      ALP 77      ALT 9      AST 16       eGFR >60      Anion Gap 10.0      BUN/Creatinine Ratio 15     URINALYSIS, REFLEX TO URINE CULTURE - Abnormal    Color, UA Yellow      Appearance, UA Turbid (*)     Specific Gravity, UA 1.023      pH, UA 6.0      Protein, UA 1+ (*)     Glucose, UA Normal      Ketones, UA Negative      Blood, UA 2+ (*)     Bilirubin, UA Negative      Urobilinogen, UA Normal      Nitrites, UA 2+ (*)     Leukocyte Esterase,  (*)     RBC, UA 6-10 (*)     WBC, UA 51-99 (*)     Bacteria, UA Many (*)     Squamous Epithelial Cells, UA Occasional (*)     Mucous, UA Many (*)    CBC WITH DIFFERENTIAL - Abnormal    WBC 7.49      RBC 3.99 (*)     Hgb 11.0 (*)     Hct 33.1 (*)     MCV 83.0      MCH 27.6      MCHC 33.2      RDW 12.9      Platelet 128 (*)     MPV 13.2 (*)     IPF 13.7 (*)     Neut % 72.3      Lymph % 15.9      Mono % 11.3      Eos % 0.0      Basophil % 0.1      Imm Grans % 0.4      Neut # 5.41      Lymph # 1.19      Mono # 0.85      Eos # 0.00      Baso # 0.01      Imm Gran # 0.03      NRBC% 0.0     HCG QUALITATIVE URINE - Normal    hCG Qualitative, Urine Negative     LIPASE - Normal    Lipase Level 11     COVID/FLU A&B PCR - Normal    Influenza A PCR Not Detected      Influenza B PCR Not Detected      SARS-CoV-2 PCR Not Detected      Narrative:     The Xpert Xpress SARS-CoV-2/FLU/RSV plus is a rapid, multiplexed real-time PCR test intended for the simultaneous qualitative detection and differentiation of SARS-CoV-2, Influenza A, Influenza B, and respiratory syncytial virus (RSV) viral RNA in either nasopharyngeal swab or nasal swab specimens.         CULTURE, URINE   CBC W/ AUTO DIFFERENTIAL    Narrative:     The following orders were created for panel order CBC Auto Differential.  Procedure                               Abnormality         Status                     ---------                               -----------         ------                     CBC with Differential[[id number]]       Abnormal            Final result                  Please view results for these tests on the individual orders.          Imaging Results              CT Abdomen Pelvis With IV Contrast NO Oral Contrast (Final result)  Result time 03/06/25 15:22:14      Final result by Ryan Alberto MD (03/06/25 15:22:14)                   Impression:      Findings compatible with pyelonephritis right kidney.      Electronically signed by: Ryan Alberto  Date:    03/06/2025  Time:    15:22               Narrative:    EXAMINATION:  CT ABDOMEN PELVIS WITH IV CONTRAST    CLINICAL HISTORY:  Nausea/vomiting;Abdominal pain, acute, nonlocalized;    TECHNIQUE:  Helical acquisition through the abdomen and pelvis with IV contrast.  Three plane reconstructions were provided for review.  mGycm. Automatic exposure control, adjustment of mA/kV or iterative reconstruction technique was used to reduce radiation.    COMPARISON:  No prior CT    FINDINGS:  The limited imaged lung bases are clear.    There is no significant abnormality of the liver.  There are gallstones.  There is no pericholecystic inflammation.  No significant abnormality of the spleen, pancreas or adrenals.    There are nephrogram defects right kidney.  No hydronephrosis.  There is some urothelial thickening on the right.  There is bladder wall thickening.    No bowel obstruction. No significant inflammatory changes of the bowel.  Normal appendix.  No free air.    Trace pelvic free fluid could be physiologic.  Abdominal aorta is normal in caliber.    No acute osseous findings.                                       US Abdomen Limited (Final result)  Result time 03/06/25 14:51:52      Final result by Saul Waddell MD (03/06/25 14:51:52)                   Impression:      Cholelithiasis without evidence of acute cholecystitis.      Electronically signed by: Saul Waddell MD  Date:    03/06/2025  Time:    14:51               Narrative:    EXAMINATION:  US ABDOMEN LIMITED    CLINICAL HISTORY:  RUQ abdominal  pain;    COMPARISON:  01/19/2025    FINDINGS:  Proximal IVC appears normal however flow is not demonstrated.  The pancreas is incompletely visualized due to body habitus and overlying bowel gas.  An abnormality is not demonstrated.    Liver is of normal size and shape no focal lesions are demonstrated.  There are multiple cholelithiasis present without evidence of acute cholecystitis.    There is hepatopetal flow in the portal vein.  Common bile duct measures 3 mm.    The right kidney measures 10.1 x 4 by 4.5 cm there are no focal lesions noted there is no hydronephrosis.                                       Medications   morphine injection 2 mg (has no administration in time range)   cefTRIAXone injection 1 g (has no administration in time range)   sodium chloride 0.9% flush 10 mL (has no administration in time range)   melatonin tablet 6 mg (has no administration in time range)   ondansetron injection 4 mg (has no administration in time range)   prochlorperazine injection Soln 5 mg (has no administration in time range)   senna-docusate 8.6-50 mg per tablet 1 tablet (has no administration in time range)   bisacodyL suppository 10 mg (has no administration in time range)   aluminum-magnesium hydroxide-simethicone 200-200-20 mg/5 mL suspension 30 mL (has no administration in time range)   acetaminophen tablet 1,000 mg (has no administration in time range)   naloxone 0.4 mg/mL injection 0.02 mg (has no administration in time range)   glucose chewable tablet 16 g (has no administration in time range)   glucose chewable tablet 24 g (has no administration in time range)   dextrose 50% injection 12.5 g (has no administration in time range)   dextrose 50% injection 25 g (has no administration in time range)   glucagon (human recombinant) injection 1 mg (has no administration in time range)   ketorolac injection 15 mg (has no administration in time range)   potassium chloride SA CR tablet 40 mEq (has no administration in  time range)   lactated ringers infusion (has no administration in time range)   sodium chloride 0.9% bolus 1,000 mL 1,000 mL (0 mLs Intravenous Stopped 3/6/25 1529)   ondansetron injection 4 mg (4 mg Intravenous Given 3/6/25 1429)   ketorolac injection 15 mg (15 mg Intravenous Given 3/6/25 1424)   iohexoL (OMNIPAQUE 350) injection 100 mL (100 mLs Intravenous Given 3/6/25 1504)   cefTRIAXone injection 1 g (1 g Intravenous Given 3/6/25 1556)   sodium chloride 0.9% bolus 1,000 mL 1,000 mL (1,000 mLs Intravenous New Bag 3/6/25 1556)     Medical Decision Making  The patient is a 34 y.o. female who presents to the Emergency Department with a chief complaint of RUQ abdominal pain. Patient states she Symptoms began yesterday and have been constant since onset. Her pain is currently rated as a 6/10 in severity and described as aching with no radiation. Associated symptoms include nausea and chills. Symptoms are aggravated with nothing and there are no alleviating factors. The patient denies vomiting or diarrhea. She reports taking nothing prior to arrival with no relief of symptoms. No other reported symptoms at this time.      Judging by the patient's chief complaint and pertinent history, the patient has the following possible differential diagnoses, including but not limited to the following.  Some of these are deemed to be lower likelihood and some more likely based on my physical exam and history combined with possible lab work and/or imaging studies.   Please see the pertinent studies, and refer to the HPI.  Some of these diagnoses will take further evaluation to fully rule out, perhaps as an outpatient and the patient was encouraged to follow up when discharged for more comprehensive evaluation.    appendicitis, biliary disease, diverticulitis,  AAA, ACS, mesenteric ischemia, intraabdominal abcess, retroperitoneal abcess, gastritis, gastroenteritis, hepatitis, hernia, pancreatitis, inflammatory bowel disease, PUD,  SBP, nephrolithiasis, DKA, sickle cell crisis, consitpation, GERD, IBS     Problems Addressed:  Calculus of gallbladder without cholecystitis without obstruction: acute illness or injury  Pyelonephritis of right kidney: acute illness or injury    Amount and/or Complexity of Data Reviewed  Labs:  Decision-making details documented in ED Course.  Radiology: ordered. Decision-making details documented in ED Course.  Discussion of management or test interpretation with external provider(s): Discussed with ED attending, , he had face to face encounter with patient.     Discussed with RAJANI Riley with hospital medicine. Accepts admission for Dr. Manuel     Risk  Prescription drug management.  Decision regarding hospitalization.               ED Course as of 03/06/25 1835   Thu Mar 06, 2025   1415 hCG Qualitative, Urine: Negative [LM]   1415 NITRITE UA(!): 2+ [LM]   1415 Leukocyte Esterase, UA(!): 250 [LM]   1415 Bacteria, UA(!): Many [LM]   1415 WBC, UA(!): 51-99 [LM]   1513 US Abdomen Limited  Impression:     Cholelithiasis without evidence of acute cholecystitis.   [LM]   1525 CT Abdomen Pelvis With IV Contrast NO Oral Contrast  Impression:     Findings compatible with pyelonephritis right kidney.   [LM]   1618 CT abdomen consistent with right pyelonephritis. Normal WBC here in ER. CT shows known cholelithiasis, normal LFTs.  [LM]   1630 Patient still reporting pain in her RUQ. Will discuss with surgery. [LM]   1630 General surgery paged [LM]   1641 Discussed with Javier with general surgery. They will come evaluate patient.  [LM]   1809 Discussed with Dr. Jameson with general surgery. General surgery to follow in consult for serial abdominal exams. Patient's pain has returned. Will admit to medicine.  [LM]   1829 Discussed with RAJANI Riley with [facility] medicine who accepts for Dr. Manuel.  [LM]      ED Course User Index  [LM] Jorge Luis Watkins NP                           Clinical Impression:  Final  diagnoses:  [N12] Pyelonephritis of right kidney (Primary)  [K80.20] Calculus of gallbladder without cholecystitis without obstruction          ED Disposition Condition    Admit Stable                  [1]         Jorge Luis Watkins NP  03/06/25 1037

## 2025-03-07 PROBLEM — E44.0 MODERATE MALNUTRITION: Status: ACTIVE | Noted: 2025-03-07

## 2025-03-07 LAB
ALBUMIN SERPL-MCNC: 2.8 G/DL (ref 3.5–5)
ALBUMIN/GLOB SERPL: 0.9 RATIO (ref 1.1–2)
ALP SERPL-CCNC: 69 UNIT/L (ref 40–150)
ALT SERPL-CCNC: 11 UNIT/L (ref 0–55)
ANION GAP SERPL CALC-SCNC: 6 MEQ/L
AST SERPL-CCNC: 19 UNIT/L (ref 5–34)
BASOPHILS # BLD AUTO: 0.01 X10(3)/MCL
BASOPHILS NFR BLD AUTO: 0.2 %
BILIRUB SERPL-MCNC: 0.5 MG/DL
BUN SERPL-MCNC: 4.4 MG/DL (ref 7–18.7)
CALCIUM SERPL-MCNC: 8.3 MG/DL (ref 8.4–10.2)
CHLORIDE SERPL-SCNC: 109 MMOL/L (ref 98–107)
CO2 SERPL-SCNC: 23 MMOL/L (ref 22–29)
CREAT SERPL-MCNC: 0.64 MG/DL (ref 0.55–1.02)
CREAT/UREA NIT SERPL: 7
EOSINOPHIL # BLD AUTO: 0.02 X10(3)/MCL (ref 0–0.9)
EOSINOPHIL NFR BLD AUTO: 0.4 %
ERYTHROCYTE [DISTWIDTH] IN BLOOD BY AUTOMATED COUNT: 13.1 % (ref 11.5–17)
GFR SERPLBLD CREATININE-BSD FMLA CKD-EPI: >60 ML/MIN/1.73/M2
GLOBULIN SER-MCNC: 3 GM/DL (ref 2.4–3.5)
GLUCOSE SERPL-MCNC: 113 MG/DL (ref 74–100)
HCT VFR BLD AUTO: 29.3 % (ref 37–47)
HGB BLD-MCNC: 9.7 G/DL (ref 12–16)
IMM GRANULOCYTES # BLD AUTO: 0.02 X10(3)/MCL (ref 0–0.04)
IMM GRANULOCYTES NFR BLD AUTO: 0.4 %
LYMPHOCYTES # BLD AUTO: 1.21 X10(3)/MCL (ref 0.6–4.6)
LYMPHOCYTES NFR BLD AUTO: 25.3 %
MAGNESIUM SERPL-MCNC: 1.8 MG/DL (ref 1.6–2.6)
MCH RBC QN AUTO: 27.8 PG (ref 27–31)
MCHC RBC AUTO-ENTMCNC: 33.1 G/DL (ref 33–36)
MCV RBC AUTO: 84 FL (ref 80–94)
MONOCYTES # BLD AUTO: 0.6 X10(3)/MCL (ref 0.1–1.3)
MONOCYTES NFR BLD AUTO: 12.6 %
NEUTROPHILS # BLD AUTO: 2.92 X10(3)/MCL (ref 2.1–9.2)
NEUTROPHILS NFR BLD AUTO: 61.1 %
NRBC BLD AUTO-RTO: 0 %
PLATELET # BLD AUTO: 117 X10(3)/MCL (ref 130–400)
PLATELETS.RETICULATED NFR BLD AUTO: 11.8 % (ref 0.9–11.2)
PMV BLD AUTO: 13.2 FL (ref 7.4–10.4)
POTASSIUM SERPL-SCNC: 4.2 MMOL/L (ref 3.5–5.1)
PROT SERPL-MCNC: 5.8 GM/DL (ref 6.4–8.3)
RBC # BLD AUTO: 3.49 X10(6)/MCL (ref 4.2–5.4)
SODIUM SERPL-SCNC: 138 MMOL/L (ref 136–145)
WBC # BLD AUTO: 4.78 X10(3)/MCL (ref 4.5–11.5)

## 2025-03-07 PROCEDURE — 63600175 PHARM REV CODE 636 W HCPCS: Mod: JZ,TB | Performed by: NURSE PRACTITIONER

## 2025-03-07 PROCEDURE — 63600175 PHARM REV CODE 636 W HCPCS: Performed by: STUDENT IN AN ORGANIZED HEALTH CARE EDUCATION/TRAINING PROGRAM

## 2025-03-07 PROCEDURE — 83735 ASSAY OF MAGNESIUM: CPT | Performed by: NURSE PRACTITIONER

## 2025-03-07 PROCEDURE — 80053 COMPREHEN METABOLIC PANEL: CPT | Performed by: NURSE PRACTITIONER

## 2025-03-07 PROCEDURE — 25000003 PHARM REV CODE 250: Performed by: NURSE PRACTITIONER

## 2025-03-07 PROCEDURE — 11000001 HC ACUTE MED/SURG PRIVATE ROOM

## 2025-03-07 PROCEDURE — 85025 COMPLETE CBC W/AUTO DIFF WBC: CPT | Performed by: NURSE PRACTITIONER

## 2025-03-07 PROCEDURE — 36415 COLL VENOUS BLD VENIPUNCTURE: CPT | Performed by: NURSE PRACTITIONER

## 2025-03-07 RX ORDER — SODIUM CHLORIDE, SODIUM LACTATE, POTASSIUM CHLORIDE, CALCIUM CHLORIDE 600; 310; 30; 20 MG/100ML; MG/100ML; MG/100ML; MG/100ML
INJECTION, SOLUTION INTRAVENOUS CONTINUOUS
Status: ACTIVE | OUTPATIENT
Start: 2025-03-07 | End: 2025-03-07

## 2025-03-07 RX ADMIN — SODIUM CHLORIDE, POTASSIUM CHLORIDE, SODIUM LACTATE AND CALCIUM CHLORIDE: 600; 310; 30; 20 INJECTION, SOLUTION INTRAVENOUS at 08:03

## 2025-03-07 RX ADMIN — KETOROLAC TROMETHAMINE 15 MG: 30 INJECTION, SOLUTION INTRAMUSCULAR at 08:03

## 2025-03-07 RX ADMIN — ACETAMINOPHEN 1000 MG: 500 TABLET ORAL at 03:03

## 2025-03-07 RX ADMIN — CEFTRIAXONE SODIUM 1 G: 1 INJECTION, POWDER, FOR SOLUTION INTRAMUSCULAR; INTRAVENOUS at 08:03

## 2025-03-07 NOTE — PLAN OF CARE
03/07/25 1204   Discharge Assessment   Assessment Type Discharge Planning Assessment   Confirmed/corrected address, phone number and insurance Yes   Confirmed Demographics Correct on Facesheet   Source of Information patient   Communicated LO with patient/caregiver Date not available/Unable to determine   Reason For Admission pyelonephritis, cholecystitis   People in Home child(ananth), dependent  (pt lives in a mobile home with her 2 dependent children with 4 steps to enter the home and no rails along the steps)   Do you expect to return to your current living situation? Yes   Do you have help at home or someone to help you manage your care at home? Yes   Who are your caregiver(s) and their phone number(s)? family   Prior to hospitilization cognitive status: Unable to Assess   Current cognitive status: Alert/Oriented   Walking or Climbing Stairs Difficulty no   Dressing/Bathing Difficulty no   Home Layout Able to live on 1st floor   Equipment Currently Used at Home none   Readmission within 30 days? No   Patient currently being followed by outpatient case management? No   Do you currently have service(s) that help you manage your care at home? No   Do you take prescription medications? Yes   Do you have prescription coverage? Yes   Coverage [employer] [facility], medicaid   Who is going to help you get home at discharge? family member   How do you get to doctors appointments? car, drives self   Are you on dialysis? No   Discharge Plan A Home   Discharge Plan B Home   DME Needed Upon Discharge  none   Discharge Plan discussed with: Patient   Transition of Care Barriers None   Housing Stability   In the last 12 months, was there a time when you were not able to pay the mortgage or rent on time? N   Transportation Needs   Has the lack of transportation kept you from medical appointments, meetings, work or from getting things needed for daily living? No   Food Insecurity   Within the past 12 months, you worried that your food  would run out before you got the money to buy more. Never true   OTHER   Name(s) of People in Home 2 dependent children     Pt's PCP is Dionne Petty NP. Pt's  is her sister, Yane ([phone]). Pt has never had HH services. Pt uses [facility] in [place]. Pt does drive. Pt reports she drove her self to the hospital. She will drive her self home. Pt has no dc needs at this time.

## 2025-03-07 NOTE — PROGRESS NOTES
"   Acute Care Surgery   Progress Note  Admit Date: 3/6/2025  HD#1  POD#* No surgery found *    Subjective:   Interval history:  AFVSS, LANDRY. Doing ok this morning but still complaining of 7/10 pain slightly better w/ medications. Tolerating CLD w/o N/V, but not eating much. Voiding w/ last BM Wednesday (3/5). Ambulating to bathroom. No chest pain, shortness of breath, or fever.    Home Meds:  Current Outpatient Medications   Medication Instructions    meclizine (ANTIVERT) 25 mg, Oral, 3 times daily PRN      Scheduled Meds:   cefTRIAXone (Rocephin) IV (PEDS and ADULTS)  1 g Intravenous Q24H     Continuous Infusions:   lactated ringers   Intravenous Continuous 100 mL/hr at 03/07/25 0806 New Bag at 03/07/25 0806     PRN Meds:  Current Facility-Administered Medications:     acetaminophen, 1,000 mg, Oral, Q6H PRN    aluminum-magnesium hydroxide-simethicone, 30 mL, Oral, QID PRN    bisacodyL, 10 mg, Rectal, Daily PRN    dextrose 50%, 12.5 g, Intravenous, PRN    dextrose 50%, 25 g, Intravenous, PRN    glucagon (human recombinant), 1 mg, Intramuscular, PRN    glucose, 16 g, Oral, PRN    glucose, 24 g, Oral, PRN    ketorolac, 15 mg, Intravenous, Q6H PRN    melatonin, 6 mg, Oral, Nightly PRN    naloxone, 0.02 mg, Intravenous, PRN    ondansetron, 4 mg, Intravenous, Q4H PRN    prochlorperazine, 5 mg, Intravenous, Q6H PRN    senna-docusate 8.6-50 mg, 1 tablet, Oral, BID PRN    sodium chloride 0.9%, 10 mL, Intravenous, PRN     Objective:     VITAL SIGNS: 24 HR MIN & MAX LAST   Temp  Min: 98.3 °F (36.8 °C)  Max: 99.8 °F (37.7 °C)  99 °F (37.2 °C)   BP  Min: 84/51  Max: 111/72  (!) 94/57    Pulse  Min: 80  Max: 107  102    Resp  Min: 16  Max: 24  16    SpO2  Min: 94 %  Max: 100 %  100 %      HT: 4' 11" (149.9 cm)  WT: 45.7 kg (100 lb 10.2 oz)  BMI: 20.3     Intake/output:  Intake/Output - Last 3 Shifts       None          No intake or output data in the 24 hours ending 03/07/25 1058      Lines/drains/airway:       Peripheral IV - " "Single Lumen 03/06/25 1400 Anterior;Proximal;Right Forearm (Active)   Site Assessment Clean;Dry;Intact;No redness;No swelling 03/07/25 0800   Line Securement Device Secured with sutureless device 03/06/25 2050   Extremity Assessment Distal to IV No redness;No abnormal discoloration;No swelling 03/06/25 2050   Line Status Capped;Flushed;Infusing 03/07/25 0800   Dressing Status Clean;Intact;Dry 03/07/25 0800   Dressing Intervention Integrity maintained 03/07/25 0800   Number of days: 0       Physical examination:  General: Well developed, well nourished, no acute distress  HEENT: Normocephalic, PERRL  CV: RR  Resp: NWOB  GI:  Abdomen soft, TTP in RUQ, non-distended, no guarding, no rebound, Santos (-)  :  tender to percussion of right upper back  MSK: No muscle atrophy, cyanosis, peripheral edema, moving all extremities spontaneously  Skin/wounds:  No rashes, ulcers, erythema  Neuro: alert and oriented to person, place, and time    Labs:  Renal:  Recent Labs     03/06/25  1349 03/07/25  0710   BUN 11.1 4.4*   CREATININE 0.74 0.64     No results for input(s): "LACTIC" in the last 72 hours.  FENGI:  Recent Labs     03/06/25  1349 03/07/25  0710    138   K 3.3* 4.2    109*   CO2 24 23   CALCIUM 9.2 8.3*   MG  --  1.80   ALBUMIN 3.2* 2.8*   BILITOT 0.4 0.5   AST 16 19   ALKPHOS 77 69   ALT 9 11     Heme:  Recent Labs     03/06/25  1349 03/07/25  0710   HGB 11.0* 9.7*   HCT 33.1* 29.3*   * 117*     ID:  Recent Labs     03/06/25  1349 03/07/25  0710   WBC 7.49 4.78     CBG:  Recent Labs     03/06/25  1349 03/07/25  0710   GLUCOSE 110* 113*      Cardiovascular:  No results for input(s): "TROPONINI", "CKTOTAL", "CKMB", "BNP" in the last 168 hours.  ABG:  No results for input(s): "PH", "PO2", "PCO2", "HCO3", "BE" in the last 168 hours.   I have reviewed all pertinent lab results within the past 24 hours.    Imaging:  CT Abdomen Pelvis With IV Contrast NO Oral Contrast   Final Result      Findings " compatible with pyelonephritis right kidney.         Electronically signed by: Ryan Alberto   Date:    03/06/2025   Time:    15:22      US Abdomen Limited   Final Result      Cholelithiasis without evidence of acute cholecystitis.         Electronically signed by: Saul Waddell MD   Date:    03/06/2025   Time:    14:51         I have reviewed all pertinent imaging results/findings within the past 24 hours.    Micro/Path/Other:  Microbiology Results (last 7 days)       Procedure Component Value Units Date/Time    Urine culture [[id number]]  (Abnormal) Collected: 03/06/25 1350    Order Status: Completed Specimen: Urine Updated: 03/07/25 0722     Urine Culture >/= 100,000 colonies/ml Gram-negative Rods           Specimens (From admission, onward)      None           Pathology Results  (Last 365 days)      None             Assessment & Plan:   34F w/ no significant PMH here w/ several days of subjective fever/chills, body-aches, and nausea starting Monday (3/3) w/ abdominal pain and decreased oral intake starting yesterday (3/5). General surgery was consulted for RUQ pain. CT concerning for pyelonephritis but also showing cholelithiasis w/o cholecystitis. US also showing cholelithiasis w/o cholecystitis.    - continue treatment of pyelonephritis  - general surgery will continue to follow for serial abdominal exams  - if RUQ pain persists, will consider lap carlos inpatient vs. outpatient    Disposition/Outpatient Follow Up/ Referrals/ Discharge Instructions:   - Disposition: Continue inpatient stay      Yuri Jameson MD  [employer] General Surgery PGY1

## 2025-03-07 NOTE — H&P
[facility] Hospital Medicine - H&P Note    Patient Name: Madison Robert  : 1990  MRN: [id number]  PCP: Dionne Petty NP  Admitting Physician:  T. Reyes, MD  Admission Class: IP- Inpatient   Code status: Full    Allergies   Patient has no known allergies.    Chief Complaint   Fever, chills, RUQ abdominal pain    History of Present Illness   34 yr old female with no PMH. Reports a 4 day history of fever, chills and generalized body aches and one day history of constant RUQ abdominal pain. Pain waxes and wanes. No aggravating factors. Improves with pain meds. Denies any cough, SOB,  dysuria or hematuria.     VS on arrival: T 99.8, P 107, R 18, B/P 88/54, Sats 100% on room air. Initial labs include WBC 7.49, Hgb 11, Hct 33, platelets 128, Na 136, K 3.3, BUN 11.1, creatinine 0.74, glucose 110. UPT negative. Flu and covid negative.  CT abdomen and pelvis shows findings compatible with right pyelonephritis.  Abdominal ultrasound shows cholelithiasis without evidence of acute cholecystitis.    ROS   Except as documented, all other systems reviewed and negative     Past Medical History   denies    Past Surgical History    x 2    Social History   Denies alcohol, tobacco or illicit drug use.     Screening for Social Drivers for health:  Patient screened for food insecurity, housing instability, transportation needs, utility difficulties, and interpersonal safety (select all that apply as identified as concern)  []Housing or Food  []Transportation Needs  []Utility Difficulties  []Interpersonal safety  [x]None        Family History   Reviewed and negative    Home Medications     NOT TAKING ANY HOME MEDS    Physical Exam   Vital Signs  Temp:  [98.3 °F (36.8 °C)-99.8 °F (37.7 °C)]   Pulse:  []   Resp:  [16-24]   BP: ()/(54-73)   SpO2:  [94 %-100 %]    General: Appears comfortable  HEENT: NC/AT  Neck:  No JVD  Chest: CTABL  CVS: Regular rhythm. Normal S1/S2.  Abdomen:  "nondistended, normoactive BS, soft and non-tender.  MSK: No obvious deformity or joint swelling  Skin: Warm and dry  Neuro: AAOx3, no focal neurological deficit  Psych: Cooperative    Labs     Recent Labs     03/06/25  1349   WBC 7.49   RBC 3.99*   HGB 11.0*   HCT 33.1*   MCV 83.0   MCH 27.6   MCHC 33.2   RDW 12.9   *     No results for input(s): "PROTIME", "INR", "PTT", "D-DIMER", "FERRITIN", "IRON", "TRANS", "TIBC", "LABIRON", "MHWSCAEC87", "FOLATE", "LDH", "HAPTOGLOBIN", "RETICCNTAUTO", "RETABS", "PERIPSMEAREV" in the last 72 hours.   Recent Labs     03/06/25  1349      K 3.3*   CO2 24   BUN 11.1   CREATININE 0.74   EGFRNORACEVR >60   GLUCOSE 110*   CALCIUM 9.2   ALBUMIN 3.2*   GLOBULIN 4.3*   ALKPHOS 77   ALT 9   AST 16   BILITOT 0.4   LIPASE 11     No results for input(s): "LACTIC" in the last 72 hours.  No results for input(s): "CPK", "TROPONINI" in the last 72 hours.     Microbiology Results (last 7 days)       Procedure Component Value Units Date/Time    Urine culture [[id number]] Collected: 03/06/25 1350    Order Status: Sent Specimen: Urine Updated: 03/06/25 1414           Imaging   CT Abdomen Pelvis With IV Contrast NO Oral Contrast  Narrative: EXAMINATION:  CT ABDOMEN PELVIS WITH IV CONTRAST    CLINICAL HISTORY:  Nausea/vomiting;Abdominal pain, acute, nonlocalized;    TECHNIQUE:  Helical acquisition through the abdomen and pelvis with IV contrast.  Three plane reconstructions were provided for review.  mGycm. Automatic exposure control, adjustment of mA/kV or iterative reconstruction technique was used to reduce radiation.    COMPARISON:  No prior CT    FINDINGS:  The limited imaged lung bases are clear.    There is no significant abnormality of the liver.  There are gallstones.  There is no pericholecystic inflammation.  No significant abnormality of the spleen, pancreas or adrenals.    There are nephrogram defects right kidney.  No hydronephrosis.  There is some urothelial " thickening on the right.  There is bladder wall thickening.    No bowel obstruction. No significant inflammatory changes of the bowel.  Normal appendix.  No free air.    Trace pelvic free fluid could be physiologic.  Abdominal aorta is normal in caliber.    No acute osseous findings.  Impression: Findings compatible with pyelonephritis right kidney.    Electronically signed by: Ryan Alberto  Date:    03/06/2025  Time:    15:22  US Abdomen Limited  Narrative: EXAMINATION:  US ABDOMEN LIMITED    CLINICAL HISTORY:  RUQ abdominal pain;    COMPARISON:  01/19/2025    FINDINGS:  Proximal IVC appears normal however flow is not demonstrated.  The pancreas is incompletely visualized due to body habitus and overlying bowel gas.  An abnormality is not demonstrated.    Liver is of normal size and shape no focal lesions are demonstrated.  There are multiple cholelithiasis present without evidence of acute cholecystitis.    There is hepatopetal flow in the portal vein.  Common bile duct measures 3 mm.    The right kidney measures 10.1 x 4 by 4.5 cm there are no focal lesions noted there is no hydronephrosis.  Impression: Cholelithiasis without evidence of acute cholecystitis.    Electronically signed by: Saul Waddell MD  Date:    03/06/2025  Time:    14:51    Assessment & Plan   Right pyelonephritis  - f/u on urine culture  - continue Rocephin   - LR at 100 ml/hr    Cholelithiasis without cholecystitis  - seen by general surgery and no acute intervention needed. Will follow with serial abdominal US    Thrombocytopenia - chronic  - platelets 4 months ago 111  - denies any bleeding (gums, stool, urine)  - recommend further outpatient w/u with PCP    Mild hypokalemia  - KCL 40 meq PO x one  - labs in AM      VTE Prophylaxis: Sixto FRANCOIS, Rosemary Deleon, P-BC have discussed this patients case with Dr. Reyes who agrees with the diagnosis and treatment plan.

## 2025-03-07 NOTE — PROGRESS NOTES
Inpatient Nutrition Assessment    Admit Date: 3/6/2025   Total duration of encounter: 1 day   Patient Age: 34 y.o.    Nutrition Recommendation/Prescription     -Advance diet as tolerated per MD. Goal Diet: Regular Diet.  -Boost Breeze TID for additional nourishment while on clears; Boost Plus once diet is advanced.   -Consider an appetite stimulant as medically feasible if poor appetite continues.   -Monitor wt, labs, and intake.     Communication of Recommendations: reviewed with patient    Nutrition Assessment     Malnutrition Assessment/Nutrition-Focused Physical Exam    Malnutrition Context: chronic illness (03/07/25 1326)  Malnutrition Level: moderate (03/07/25 1326)  Energy Intake (Malnutrition): less than or equal to 75% for greater than or equal to 1 month (03/07/25 1326)  Weight Loss (Malnutrition): greater than 5% in 1 month (03/07/25 1326)  Subcutaneous Fat (Malnutrition): mild depletion (03/07/25 1326)  Orbital Region (Subcutaneous Fat Loss): mild depletion        Muscle Mass (Malnutrition): mild depletion (03/07/25 1326)  Pentecostal Region (Muscle Loss): mild depletion                       Fluid Accumulation (Malnutrition): other (see comments) (Not present) (03/07/25 1326)        A minimum of two characteristics is recommended for diagnosis of either severe or non-severe malnutrition.    Chart Review    Reason Seen: malnutrition screening tool (MST)    Malnutrition Screening Tool Results   Have you recently lost weight without trying?: Yes: 2-13 lbs  Have you been eating poorly because of a decreased appetite?: Yes   MST Score: 2   Diagnosis:  Right pyelonephritis-GNR  Cholelithiasis without cholecystitis  Thrombocytopenia - chronic  Hypokalemia   Borderline low normal blood pressure   Sinus tachycardia, mild     Relevant Medical History: none noted    Scheduled Medications:  cefTRIAXone (Rocephin) IV (PEDS and ADULTS), 1 g, Q24H    Continuous Infusions:  lactated ringers, Last Rate: 100 mL/hr at 03/07/25  "0806    PRN Medications:  acetaminophen, 1,000 mg, Q6H PRN  aluminum-magnesium hydroxide-simethicone, 30 mL, QID PRN  bisacodyL, 10 mg, Daily PRN  dextrose 50%, 12.5 g, PRN  dextrose 50%, 25 g, PRN  glucagon (human recombinant), 1 mg, PRN  glucose, 16 g, PRN  glucose, 24 g, PRN  ketorolac, 15 mg, Q6H PRN  melatonin, 6 mg, Nightly PRN  naloxone, 0.02 mg, PRN  ondansetron, 4 mg, Q4H PRN  prochlorperazine, 5 mg, Q6H PRN  senna-docusate 8.6-50 mg, 1 tablet, BID PRN  sodium chloride 0.9%, 10 mL, PRN    Calorie Containing IV Medications: no significant kcals from medications at this time    Recent Labs   Lab 25  1349 25  0710    138   K 3.3* 4.2   CALCIUM 9.2 8.3*   MG  --  1.80    109*   CO2 24 23   BUN 11.1 4.4*   CREATININE 0.74 0.64   EGFRNORACEVR >60 >60   GLUCOSE 110* 113*   BILITOT 0.4 0.5   ALKPHOS 77 69   ALT 9 11   AST 16 19   ALBUMIN 3.2* 2.8*   LIPASE 11  --    WBC 7.49 4.78   HGB 11.0* 9.7*   HCT 33.1* 29.3*     Nutrition Orders:  Diet Clear Liquid Standard Tray      Appetite/Oral Intake: poor/0-25% of meals  Factors Affecting Nutritional Intake: clear liquid diet and decreased appetite  Social Needs Impacting Access to Food: none identified  Food/Congregation/Cultural Preferences: none reported  Food Allergies: no known food allergies  Last Bowel Movement: 25  Wound(s):  skin intact per EMR    Comments    3/7/25: Pt reports taking in a few sips of some of the clear liquids; denies having any n/v; reports decreased appetite over the last 2 months with ~10 lbs of wt loss.     Anthropometrics    Height: 4' 11.02" (149.9 cm), Height Method: Stated  Last Weight: 45.7 kg (100 lb 10.2 oz) (25 1323), Weight Method: Standard Scale  BMI (Calculated): 20.3  BMI Classification: normal (BMI 18.5-24.9)     Ideal Body Weight (IBW), Female: 95.1 lb     % Ideal Body Weight, Female (lb): 105.83 %                    Usual Body Weight (UBW), k kg  % Usual Body Weight: 91.49  % Weight Change " From Usual Weight: -8.7 %  Usual Weight Provided By: patient    Wt Readings from Last 5 Encounters:   03/07/25 45.7 kg (100 lb 10.2 oz)   01/19/25 49.9 kg (110 lb)   10/08/24 49.9 kg (110 lb)     Weight Change(s) Since Admission:   Wt Readings from Last 1 Encounters:   03/07/25 1323 45.7 kg (100 lb 10.2 oz)   03/06/25 2050 45.7 kg (100 lb 10.2 oz)   03/06/25 1327 49.9 kg (110 lb)   Admit Weight: 49.9 kg (110 lb) (03/06/25 1327), Weight Method: Standard Scale    Estimated Needs    Weight Used For Calorie Calculations: 45.7 kg (100 lb 12 oz)  Energy Calorie Requirements (kcal): 4664-6359 kcal (30-35 kcal/kg)  Energy Need Method: Kcal/kg  Weight Used For Protein Calculations: 45.7 kg (100 lb 12 oz)  Protein Requirements: 69 gm (1.5g/kg)  Fluid Requirements (mL): 1371 mL        Enteral Nutrition     Patient not receiving enteral nutrition at this time.    Parenteral Nutrition     Patient not receiving parenteral nutrition support at this time.    Evaluation of Received Nutrient Intake    Calories: not meeting estimated needs  Protein: not meeting estimated needs    Patient Education     Not applicable.    Nutrition Diagnosis     PES: Inadequate oral intake related to acute illness as evidenced by clear liquid diet since admit. (new)     PES: Moderate chronic disease or condition related malnutrition Related to suboptimal protein intake As Evidenced by:  - weight loss: > 5% in 1 month - energy intake: <= 75% for 2 months (meets criteria for <= 75% >= 1 month (severe - chronic)) - muscle mass depletion: 1 area of mild muscle loss (Temporalis) - loss of subcutaneous fat: 1 area of mild fat loss (Infraorbital) new    Nutrition Interventions     Intervention(s): general/healthful diet, commercial beverage, prescription medication, and collaboration with other providers  Intervention(s): Oral nutritional supplement    Goal: Meet greater than 80% of nutritional needs by follow-up. (new)  Goal: Maintain weight throughout  hospitalization. (new)    Nutrition Goals & Monitoring     Dietitian will monitor: energy intake and weight  Discharge planning: too early to determine; pending clinical course  Nutrition Risk/Follow-Up: moderate (follow-up in 3-5 days)   Please consult if re-assessment needed sooner.

## 2025-03-07 NOTE — PROGRESS NOTES
[facility] Hospital Medicine Progress Note        Chief Complaint   Fever, chills, RUQ abdominal pain     History of Present Illness   34 yr old female with no PMH. Reports a 4 day history of fever, chills and generalized body aches and one day history of constant RUQ abdominal pain. Pain waxes and wanes. No aggravating factors. Improves with pain meds. Denies any cough, SOB,  dysuria or hematuria.      VS on arrival: T 99.8, P 107, R 18, B/P 88/54, Sats 100% on room air. Initial labs include WBC 7.49, Hgb 11, Hct 33, platelets 128, Na 136, K 3.3, BUN 11.1, creatinine 0.74, glucose 110. UPT negative. Flu and covid negative.  CT abdomen and pelvis shows findings compatible with right pyelonephritis.  Abdominal ultrasound shows cholelithiasis without evidence of acute cholecystitis.    Patient currently being managed for right-sided pyelonephritis.  Urine cultures growing Gram-negative christen.  She is currently on IV Rocephin     Today's information   Patient seen and examined at bedside, no family member at bedside   Patient reports she is a little better from admission but still feels unwell   Vitals reviewed with blood pressure low normal, mildly elevated heart rate, saturating adequately on room air   Urine cultures with Gram-negative rods   Blood cultures pending     Case was discussed with patient's nurse and  on the floor.    Objective/physical exam:  General: Appears comfortable  HEENT: NC/AT  Neck:  No JVD  Chest: CTABL  CVS: Regular rhythm. Normal S1/S2.  Abdomen: nondistended, normoactive BS, soft and non-tender.  MSK: No obvious deformity or joint swelling  Skin: Warm and dry  Neuro: AAOx3, no focal neurological deficit  Psych: Cooperative       Assessment/Plan:  Right pyelonephritis-GNR  Cholelithiasis without cholecystitis  Thrombocytopenia - chronic  Hypokalemia   Borderline low normal blood pressure   Sinus tachycardia, mild       PLAN  Continue with IV  ceftriaxone, day 2   Follow up with urine cultures till complete currently Gram-negative rods   Follow up with blood cultures   Continue IV LR at 100 cc/hour   Appreciate General surgery input no indications for intervention at this time   Will follow with serial abdominal US  - platelets 4 months ago 111  - denies any bleeding (gums, stool, urine)  - recommend further outpatient w/u with PCP       VTE prophylaxis: scds d/t low plt     Patient condition:  Stable/Fair/Guarded/ Serious/ Critical    Anticipated discharge and Disposition:   Pending urine cultures      All diagnosis and differential diagnosis have been reviewed; assessment and plan has been documented; I have personally reviewed the labs and test results that are presently available; I have reviewed the patients medication list; I have reviewed the consulting providers response and recommendations. I have reviewed or attempted to review medical records based upon their availability    All of the patient's questions have been  addressed and answered. Patient's is agreeable to the above stated plan. I will continue to monitor closely and make adjustments to medical management as needed.    Portions of this note dictated using EMR integrated voice recognition software, and may be subject to voice recognition errors not corrected at proofreading. Please contact writer for clarification if needed.     VITAL SIGNS: 24 HRS MIN & MAX LAST   Temp  Min: 97.7 °F (36.5 °C)  Max: 98.4 °F (36.9 °C) 98.4 °F (36.9 °C)   BP  Min: 102/63  Max: 116/68 110/71   Pulse  Min: 56  Max: 69  69   Resp  Min: 18  Max: 18 18   SpO2  Min: 93 %  Max: 97 % (!) 93 %     I have reviewed the following labs:  Recent Labs   Lab 08/12/24  0441 08/13/24  0433 08/14/24  0500   WBC 7.19 5.66 5.53   RBC 4.95 4.46* 4.39*   HGB 14.8 13.6* 13.4*   HCT 43.5 39.0* 38.4*   MCV 87.9 87.4 87.5   MCH 29.9 30.5 30.5   MCHC 34.0 34.9 34.9   RDW 12.8 12.7 12.7    206 200   MPV 9.0 9.0 9.4     Recent  Labs   Lab 08/12/24  0441 08/13/24  0433 08/14/24  0500 08/15/24  0508   * 133* 132* 135*   K 3.5 3.4* 3.3* 4.1   CL 97* 101 98 101   CO2 24 22* 23 22*   BUN 20.0 19.6 16.7 15.4   CREATININE 1.05 1.00 0.99 0.92   CALCIUM 9.3 9.0 8.9 9.2   MG 2.60 2.10 1.80 2.00   ALBUMIN 3.6 3.4 3.3*  --    ALKPHOS 112 103 97  --    ALT 22 19 20  --    AST 24 20 25  --    BILITOT 0.8 0.7 0.7  --      Microbiology Results (last 7 days)       ** No results found for the last 168 hours. **          _____________________________________________________________________    Malnutrition Status:    Scheduled Med:   apixaban  5 mg Oral BID    aspirin  81 mg Oral Daily    atorvastatin  40 mg Oral QHS    diltiaZEM  360 mg Oral Daily    docusate sodium  50 mg Oral BID    ergocalciferol  50,000 Units Oral Q7 Days    guaiFENesin  600 mg Oral BID    insulin glargine U-100  15 Units Subcutaneous QHS    QUEtiapine  150 mg Oral Nightly      Continuous Infusions:     PRN Meds:    Current Facility-Administered Medications:     acetaminophen, 1,000 mg, Oral, Q6H PRN    aluminum-magnesium hydroxide-simethicone, 30 mL, Oral, QID PRN    bisacodyL, 10 mg, Rectal, Daily PRN    dextrose 10%, 12.5 g, Intravenous, PRN    dextrose 10%, 25 g, Intravenous, PRN    glucagon (human recombinant), 1 mg, Intramuscular, PRN    glucose, 16 g, Oral, PRN    glucose, 24 g, Oral, PRN    guaiFENesin 100 mg/5 ml, 200 mg, Oral, Q4H PRN    HYDROcodone-acetaminophen, 1 tablet, Oral, Q4H PRN    insulin aspart U-100, 1-10 Units, Subcutaneous, QID (AC + HS) PRN    melatonin, 6 mg, Oral, Nightly PRN    naloxone, 0.02 mg, Intravenous, PRN    ondansetron, 4 mg, Intravenous, Q4H PRN    prochlorperazine, 5 mg, Intravenous, Q6H PRN    senna-docusate 8.6-50 mg, 1 tablet, Oral, BID PRN    sodium chloride 0.9%, 10 mL, Intravenous, PRN     Radiology:  I have personally reviewed the following imaging and agree with the radiologist.     Cardiac catheterization  Procedure performed in the  Invasive Lab    - See Procedure Log link below for nursing documentation    - See OpNote on Surgeries Tab for physician findings    - See Imaging Tab for radiologist dictation      Angel Zamora MD  Department of Hospital Medicine   [facility]   08/18/2024

## 2025-03-08 LAB
BACTERIA UR CULT: ABNORMAL
OHS QRS DURATION: 98 MS
OHS QTC CALCULATION: 388 MS

## 2025-03-08 PROCEDURE — 25000003 PHARM REV CODE 250: Performed by: NURSE PRACTITIONER

## 2025-03-08 PROCEDURE — 63600175 PHARM REV CODE 636 W HCPCS: Mod: JZ,TB | Performed by: NURSE PRACTITIONER

## 2025-03-08 PROCEDURE — 93010 ELECTROCARDIOGRAM REPORT: CPT | Mod: ,,, | Performed by: INTERNAL MEDICINE

## 2025-03-08 PROCEDURE — 93005 ELECTROCARDIOGRAM TRACING: CPT

## 2025-03-08 PROCEDURE — 25000003 PHARM REV CODE 250: Performed by: INTERNAL MEDICINE

## 2025-03-08 PROCEDURE — 11000001 HC ACUTE MED/SURG PRIVATE ROOM

## 2025-03-08 PROCEDURE — 63600175 PHARM REV CODE 636 W HCPCS: Performed by: STUDENT IN AN ORGANIZED HEALTH CARE EDUCATION/TRAINING PROGRAM

## 2025-03-08 RX ORDER — HYDROCODONE BITARTRATE AND ACETAMINOPHEN 5; 325 MG/1; MG/1
1 TABLET ORAL EVERY 8 HOURS PRN
Refills: 0 | Status: DISCONTINUED | OUTPATIENT
Start: 2025-03-08 | End: 2025-03-09 | Stop reason: HOSPADM

## 2025-03-08 RX ADMIN — HYDROCODONE BITARTRATE AND ACETAMINOPHEN 1 TABLET: 5; 325 TABLET ORAL at 06:03

## 2025-03-08 RX ADMIN — KETOROLAC TROMETHAMINE 15 MG: 30 INJECTION, SOLUTION INTRAMUSCULAR at 10:03

## 2025-03-08 RX ADMIN — CEFTRIAXONE SODIUM 1 G: 1 INJECTION, POWDER, FOR SOLUTION INTRAMUSCULAR; INTRAVENOUS at 08:03

## 2025-03-08 RX ADMIN — ACETAMINOPHEN 1000 MG: 500 TABLET ORAL at 03:03

## 2025-03-08 NOTE — PROGRESS NOTES
"   Acute Care Surgery   Progress Note  Admit Date: 3/6/2025  HD#2  POD#* No surgery found *    Subjective:   Interval history:  AF VSWNL  NAEON  Patient still has some mild RUQ tenderness  Expresses that she would rather have gallbladder removed on outpatient basis   Tolerating diet no N/V    Home Meds:  Current Outpatient Medications   Medication Instructions    meclizine (ANTIVERT) 25 mg, Oral, 3 times daily PRN      Scheduled Meds:   cefTRIAXone (Rocephin) IV (PEDS and ADULTS)  1 g Intravenous Q24H     Continuous Infusions:      PRN Meds:  Current Facility-Administered Medications:     acetaminophen, 1,000 mg, Oral, Q6H PRN    aluminum-magnesium hydroxide-simethicone, 30 mL, Oral, QID PRN    bisacodyL, 10 mg, Rectal, Daily PRN    dextrose 50%, 12.5 g, Intravenous, PRN    dextrose 50%, 25 g, Intravenous, PRN    glucagon (human recombinant), 1 mg, Intramuscular, PRN    glucose, 16 g, Oral, PRN    glucose, 24 g, Oral, PRN    ketorolac, 15 mg, Intravenous, Q6H PRN    melatonin, 6 mg, Oral, Nightly PRN    naloxone, 0.02 mg, Intravenous, PRN    ondansetron, 4 mg, Intravenous, Q4H PRN    prochlorperazine, 5 mg, Intravenous, Q6H PRN    senna-docusate 8.6-50 mg, 1 tablet, Oral, BID PRN    sodium chloride 0.9%, 10 mL, Intravenous, PRN     Objective:     VITAL SIGNS: 24 HR MIN & MAX LAST   Temp  Min: 97.7 °F (36.5 °C)  Max: 98.5 °F (36.9 °C)  97.7 °F (36.5 °C)   BP  Min: 91/52  Max: 105/69  105/69    Pulse  Min: 68  Max: 93  76    Resp  Min: 17  Max: 18  17    SpO2  Min: 95 %  Max: 100 %  98 %      HT: 4' 11.02" (149.9 cm)  WT: 45.7 kg (100 lb 10.2 oz)  BMI: 20.3     Intake/output:  Intake/Output - Last 3 Shifts         03/06 0700  03/07 0659 03/07 0700 03/08 0659 03/08 0700 03/09 0659    P.O.  480     I.V. (mL/kg)  600 (13.2)     Total Intake(mL/kg)  1080 (23.7)     Net  +1080            Urine Occurrence  11 x     Stool Occurrence  0 x     Emesis Occurrence  0 x             Intake/Output Summary (Last 24 hours) at " "3/8/2025 1058  Last data filed at 3/8/2025 0359  Gross per 24 hour   Intake 1080 ml   Output --   Net 1080 ml         Lines/drains/airway:       Peripheral IV - Single Lumen 03/06/25 1400 Anterior;Proximal;Right Forearm (Active)   Site Assessment Clean;Dry;Intact;No redness;No swelling 03/07/25 0800   Line Securement Device Secured with sutureless device 03/06/25 2050   Extremity Assessment Distal to IV No redness;No abnormal discoloration;No swelling 03/06/25 2050   Line Status Capped;Flushed;Infusing 03/07/25 0800   Dressing Status Clean;Intact;Dry 03/07/25 0800   Dressing Intervention Integrity maintained 03/07/25 0800   Number of days: 0       Physical examination:  General: Well developed, well nourished, no acute distress  HEENT: Normocephalic, PERRL  CV: RR  Resp: NWOB  GI:  Abdomen soft, mild TTP in RUQ, non-distended, no guarding, no rebound, Santos (-)  :  tender to percussion of right upper back  MSK: No muscle atrophy, cyanosis, peripheral edema, moving all extremities spontaneously  Skin/wounds:  No rashes, ulcers, erythema  Neuro: alert and oriented to person, place, and time    Labs:  Renal:  Recent Labs     03/06/25  1349 03/07/25  0710   BUN 11.1 4.4*   CREATININE 0.74 0.64     No results for input(s): "LACTIC" in the last 72 hours.  FENGI:  Recent Labs     03/06/25  1349 03/07/25  0710    138   K 3.3* 4.2    109*   CO2 24 23   CALCIUM 9.2 8.3*   MG  --  1.80   ALBUMIN 3.2* 2.8*   BILITOT 0.4 0.5   AST 16 19   ALKPHOS 77 69   ALT 9 11     Heme:  Recent Labs     03/06/25  1349 03/07/25  0710   HGB 11.0* 9.7*   HCT 33.1* 29.3*   * 117*     ID:  Recent Labs     03/06/25  1349 03/07/25  0710   WBC 7.49 4.78     CBG:  Recent Labs     03/06/25  1349 03/07/25  0710   GLUCOSE 110* 113*      Cardiovascular:  No results for input(s): "TROPONINI", "CKTOTAL", "CKMB", "BNP" in the last 168 hours.  ABG:  No results for input(s): "PH", "PO2", "PCO2", "HCO3", "BE" in the last 168 hours.   I " have reviewed all pertinent lab results within the past 24 hours.    Imaging:  CT Abdomen Pelvis With IV Contrast NO Oral Contrast   Final Result      Findings compatible with pyelonephritis right kidney.         Electronically signed by: Ryan Alberto   Date:    03/06/2025   Time:    15:22      US Abdomen Limited   Final Result      Cholelithiasis without evidence of acute cholecystitis.         Electronically signed by: Saul Waddell MD   Date:    03/06/2025   Time:    14:51         I have reviewed all pertinent imaging results/findings within the past 24 hours.    Micro/Path/Other:  Microbiology Results (last 7 days)       Procedure Component Value Units Date/Time    Urine culture [[id number]]  (Abnormal)  (Susceptibility) Collected: 03/06/25 1350    Order Status: Completed Specimen: Urine Updated: 03/08/25 1030     Urine Culture >/= 100,000 colonies/ml Escherichia coli           Specimens (From admission, onward)      None           Pathology Results  (Last 365 days)      None             Assessment & Plan:   34F w/ no significant PMH here w/ several days of subjective fever/chills, body-aches, and nausea starting Monday (3/3) w/ abdominal pain and decreased oral intake starting yesterday (3/5). General surgery was consulted for RUQ pain. CT concerning for pyelonephritis but also showing cholelithiasis w/o cholecystitis. US also showing cholelithiasis w/o cholecystitis.     - continue treatment of pyelonephritis  - No plans for surgery while inpatient, discussed with her and she would rather it be managed on outpatient basis   - return precautions   - outpatient referral for General Surgery at [facility] placed in discharge tab  - reach out with any questions, concerns, or changes in clinical picture     Lenny Tolbert MD  [employer] General [facility]  03/08/2025

## 2025-03-08 NOTE — PROGRESS NOTES
[facility] Hospital Medicine Progress Note        Chief Complaint: Inpatient Follow-up     HPI:   34 yr old female with no significant pmh reports a 4 day history of fever, chills and generalized body aches and one day history of constant RUQ/ flank pain. Pain waxes and wanes. No aggravating factors. Improves with pain meds. Denies any cough, SOB,  dysuria or hematuria. She went to urgent Care, where viral panel was negative however she states she was prescribed Tamiflu and sent home. However symptoms did not improve therefore she presents to our facility. She did have a recent urinary tract infection in January that grew E coli, patient states she was treated with 5 days of cefdinir.      VS on arrival: T 99.8, P 107, R 18, B/P 88/54, Sats 100% on room air. Initial labs include WBC 7.49, Hgb 11, Hct 33, platelets 128, Na 136, K 3.3, BUN 11.1, creatinine 0.74, glucose 110. UPT negative. Flu and covid negative.  CT abdomen and pelvis shows findings compatible with right pyelonephritis.  Abdominal ultrasound shows cholelithiasis without evidence of acute cholecystitis.    Interval Hx:     AF. NAEON. still has some mild RUQ tenderness   Patient prefers to have gallbladder removed on outpatient basis     Case was discussed with patient's nurse and  on the floor.    Objective/physical exam:  General: In no acute distress, afebrile  Chest: Clear to auscultation bilaterally  Heart: RRR, +S1, S2, no appreciable murmur  Abdomen: Soft, BS +, still has some mild RUQ tenderness   MSK: Warm, no lower extremity edema, no clubbing or cyanosis  Neurologic: Alert and oriented x4, Cranial nerve II-XII intact, Strength 5/5 in all 4 extremities    VITAL SIGNS: 24 HRS MIN & MAX LAST   Temp  Min: 98 °F (36.7 °C)  Max: 99 °F (37.2 °C) 98.1 °F (36.7 °C)   BP  Min: 91/52  Max: 99/59 (!) 92/52   Pulse  Min: 68  Max: 102  78   Resp  Min: 16  Max: 18 17   SpO2  Min: 95 %  Max: 100 % 99 %     I have  reviewed the following labs:  Recent Labs   Lab 03/06/25  1349 03/07/25  0710   WBC 7.49 4.78   RBC 3.99* 3.49*   HGB 11.0* 9.7*   HCT 33.1* 29.3*   MCV 83.0 84.0   MCH 27.6 27.8   MCHC 33.2 33.1   RDW 12.9 13.1   * 117*   MPV 13.2* 13.2*     Recent Labs   Lab 03/06/25  1349 03/07/25  0710    138   K 3.3* 4.2    109*   CO2 24 23   BUN 11.1 4.4*   CREATININE 0.74 0.64   CALCIUM 9.2 8.3*   MG  --  1.80   ALBUMIN 3.2* 2.8*   ALKPHOS 77 69   ALT 9 11   AST 16 19   BILITOT 0.4 0.5     Microbiology Results (last 7 days)       Procedure Component Value Units Date/Time    Urine culture [[id number]]  (Abnormal) Collected: 03/06/25 1350    Order Status: Completed Specimen: Urine Updated: 03/07/25 0722     Urine Culture >/= 100,000 colonies/ml Gram-negative Rods             See below for Radiology    Assessment/Plan:    Right pyelonephritis  Cholelithiasis without cholecystitis  Normocytic anemia  Thrombocytopenia - chronic  Hypokalemia   Borderline low normal blood pressure   Sinus tachycardia, mild     CT AP 3/6 Findings compatible with pyelonephritis right kidney.   US abd 3/6 Cholelithiasis without evidence of acute cholecystitis.     Plan  continue Rocephin   F/u urine culture till complete currently Gram-negative rods   F/u blood cultures   S/p LR  - had 5 UTIs in the past year, may need to consider urology evaluation outpatient for recurrent UTIs    Cholelithiasis- seen by general surgery and no acute intervention needed.   - No plans for surgery while inpatient, discussed with her and she would rather it be managed on outpatient basis     Anemia - iron studies, fa, b12    platelets 4 months ago 111; denies any bleeding (gums, stool, urine)  - recommend further outpatient w/u with PCP    Hypokalemia - replace as needed    Labs in AM    VTE prophylaxis: SCDs d/t low plt     Patient condition:  Stable    Anticipated discharge and Disposition:         All diagnosis and differential diagnosis have been  reviewed; assessment and plan has been documented; I have personally reviewed the labs and test results that are presently available; I have reviewed the patients medication list; I have reviewed the consulting providers response and recommendations. I have reviewed or attempted to review medical records based upon their availability    All of the patient's questions have been  addressed and answered. Patient's is agreeable to the above stated plan. I will continue to monitor closely and make adjustments to medical management as needed.    Portions of this note dictated using EMR integrated voice recognition software, and may be subject to voice recognition errors not corrected at proofreading. Please contact writer for clarification if needed.   _____________________________________________________________________    Malnutrition Status:  Nutrition consulted. Most recent weight and BMI monitored-     Measurements:  Wt Readings from Last 1 Encounters:   03/07/25 45.7 kg (100 lb 10.2 oz)   Body mass index is 20.32 kg/m².    Patient has been screened and assessed by RD.    Malnutrition Type:  Context: chronic illness  Level: moderate    Malnutrition Characteristic Summary:  Weight Loss (Malnutrition): greater than 5% in 1 month  Energy Intake (Malnutrition): less than or equal to 75% for greater than or equal to 1 month  Subcutaneous Fat (Malnutrition): mild depletion  Muscle Mass (Malnutrition): mild depletion  Fluid Accumulation (Malnutrition): other (see comments) (Not present)    Interventions/Recommendations (treatment strategy):  Oral nutritional supplement     Scheduled Med:   cefTRIAXone (Rocephin) IV (PEDS and ADULTS)  1 g Intravenous Q24H      Continuous Infusions:     PRN Meds:    Current Facility-Administered Medications:     acetaminophen, 1,000 mg, Oral, Q6H PRN    aluminum-magnesium hydroxide-simethicone, 30 mL, Oral, QID PRN    bisacodyL, 10 mg, Rectal, Daily PRN    dextrose 50%, 12.5 g, Intravenous,  PRN    dextrose 50%, 25 g, Intravenous, PRN    glucagon (human recombinant), 1 mg, Intramuscular, PRN    glucose, 16 g, Oral, PRN    glucose, 24 g, Oral, PRN    ketorolac, 15 mg, Intravenous, Q6H PRN    melatonin, 6 mg, Oral, Nightly PRN    naloxone, 0.02 mg, Intravenous, PRN    ondansetron, 4 mg, Intravenous, Q4H PRN    prochlorperazine, 5 mg, Intravenous, Q6H PRN    senna-docusate 8.6-50 mg, 1 tablet, Oral, BID PRN    sodium chloride 0.9%, 10 mL, Intravenous, PRN     Radiology:  I have personally reviewed the following imaging and agree with the radiologist.     CT Abdomen Pelvis With IV Contrast NO Oral Contrast  Narrative: EXAMINATION:  CT ABDOMEN PELVIS WITH IV CONTRAST    CLINICAL HISTORY:  Nausea/vomiting;Abdominal pain, acute, nonlocalized;    TECHNIQUE:  Helical acquisition through the abdomen and pelvis with IV contrast.  Three plane reconstructions were provided for review.  mGycm. Automatic exposure control, adjustment of mA/kV or iterative reconstruction technique was used to reduce radiation.    COMPARISON:  No prior CT    FINDINGS:  The limited imaged lung bases are clear.    There is no significant abnormality of the liver.  There are gallstones.  There is no pericholecystic inflammation.  No significant abnormality of the spleen, pancreas or adrenals.    There are nephrogram defects right kidney.  No hydronephrosis.  There is some urothelial thickening on the right.  There is bladder wall thickening.    No bowel obstruction. No significant inflammatory changes of the bowel.  Normal appendix.  No free air.    Trace pelvic free fluid could be physiologic.  Abdominal aorta is normal in caliber.    No acute osseous findings.  Impression: Findings compatible with pyelonephritis right kidney.    Electronically signed by: Ryan Alberto  Date:    03/06/2025  Time:    15:22  US Abdomen Limited  Narrative: EXAMINATION:  US ABDOMEN LIMITED    CLINICAL HISTORY:  RUQ abdominal  pain;    COMPARISON:  01/19/2025    FINDINGS:  Proximal IVC appears normal however flow is not demonstrated.  The pancreas is incompletely visualized due to body habitus and overlying bowel gas.  An abnormality is not demonstrated.    Liver is of normal size and shape no focal lesions are demonstrated.  There are multiple cholelithiasis present without evidence of acute cholecystitis.    There is hepatopetal flow in the portal vein.  Common bile duct measures 3 mm.    The right kidney measures 10.1 x 4 by 4.5 cm there are no focal lesions noted there is no hydronephrosis.  Impression: Cholelithiasis without evidence of acute cholecystitis.    Electronically signed by: Saul Waddell MD  Date:    03/06/2025  Time:    14:51      Mitch Low MD  Department of Hospital Medicine   [facility]   03/08/2025

## 2025-03-09 VITALS
WEIGHT: 100.63 LBS | OXYGEN SATURATION: 99 % | DIASTOLIC BLOOD PRESSURE: 51 MMHG | HEIGHT: 59 IN | BODY MASS INDEX: 20.28 KG/M2 | SYSTOLIC BLOOD PRESSURE: 91 MMHG | HEART RATE: 86 BPM | TEMPERATURE: 99 F | RESPIRATION RATE: 16 BRPM

## 2025-03-09 PROBLEM — N12 PYELONEPHRITIS OF RIGHT KIDNEY: Status: ACTIVE | Noted: 2025-03-09

## 2025-03-09 PROBLEM — K80.20 CALCULUS OF GALLBLADDER WITHOUT CHOLECYSTITIS WITHOUT OBSTRUCTION: Status: ACTIVE | Noted: 2025-03-09

## 2025-03-09 LAB
ANION GAP SERPL CALC-SCNC: 8 MEQ/L
BUN SERPL-MCNC: 4 MG/DL (ref 7–18.7)
CALCIUM SERPL-MCNC: 8.6 MG/DL (ref 8.4–10.2)
CHLORIDE SERPL-SCNC: 104 MMOL/L (ref 98–107)
CO2 SERPL-SCNC: 28 MMOL/L (ref 22–29)
CREAT SERPL-MCNC: 0.65 MG/DL (ref 0.55–1.02)
CREAT/UREA NIT SERPL: 6
ERYTHROCYTE [DISTWIDTH] IN BLOOD BY AUTOMATED COUNT: 12.8 % (ref 11.5–17)
FERRITIN SERPL-MCNC: 157.38 NG/ML (ref 4.63–204)
FOLATE SERPL-MCNC: 7.6 NG/ML (ref 7–31.4)
GFR SERPLBLD CREATININE-BSD FMLA CKD-EPI: >60 ML/MIN/1.73/M2
GLUCOSE SERPL-MCNC: 88 MG/DL (ref 74–100)
HCT VFR BLD AUTO: 31.7 % (ref 37–47)
HGB BLD-MCNC: 10.4 G/DL (ref 12–16)
IRON SATN MFR SERPL: 15 % (ref 20–50)
IRON SERPL-MCNC: 23 UG/DL (ref 50–170)
MCH RBC QN AUTO: 27.5 PG (ref 27–31)
MCHC RBC AUTO-ENTMCNC: 32.8 G/DL (ref 33–36)
MCV RBC AUTO: 83.9 FL (ref 80–94)
NRBC BLD AUTO-RTO: 0 %
PLATELET # BLD AUTO: 151 X10(3)/MCL (ref 130–400)
PMV BLD AUTO: 11.9 FL (ref 7.4–10.4)
POTASSIUM SERPL-SCNC: 4.1 MMOL/L (ref 3.5–5.1)
RBC # BLD AUTO: 3.78 X10(6)/MCL (ref 4.2–5.4)
SODIUM SERPL-SCNC: 140 MMOL/L (ref 136–145)
TIBC SERPL-MCNC: 135 UG/DL (ref 70–310)
TIBC SERPL-MCNC: 158 UG/DL (ref 250–450)
TRANSFERRIN SERPL-MCNC: 140 MG/DL (ref 180–382)
VIT B12 SERPL-MCNC: 847 PG/ML (ref 213–816)
WBC # BLD AUTO: 5.37 X10(3)/MCL (ref 4.5–11.5)

## 2025-03-09 PROCEDURE — 82607 VITAMIN B-12: CPT | Performed by: INTERNAL MEDICINE

## 2025-03-09 PROCEDURE — 82746 ASSAY OF FOLIC ACID SERUM: CPT | Performed by: INTERNAL MEDICINE

## 2025-03-09 PROCEDURE — 82728 ASSAY OF FERRITIN: CPT | Performed by: INTERNAL MEDICINE

## 2025-03-09 PROCEDURE — 83550 IRON BINDING TEST: CPT | Performed by: INTERNAL MEDICINE

## 2025-03-09 PROCEDURE — 85027 COMPLETE CBC AUTOMATED: CPT | Performed by: INTERNAL MEDICINE

## 2025-03-09 PROCEDURE — 36415 COLL VENOUS BLD VENIPUNCTURE: CPT | Performed by: INTERNAL MEDICINE

## 2025-03-09 PROCEDURE — 80048 BASIC METABOLIC PNL TOTAL CA: CPT | Performed by: INTERNAL MEDICINE

## 2025-03-09 PROCEDURE — 63600175 PHARM REV CODE 636 W HCPCS: Performed by: STUDENT IN AN ORGANIZED HEALTH CARE EDUCATION/TRAINING PROGRAM

## 2025-03-09 RX ORDER — ACETAMINOPHEN 500 MG
1000 TABLET ORAL EVERY 8 HOURS PRN
Qty: 20 TABLET | Refills: 0 | Status: SHIPPED | OUTPATIENT
Start: 2025-03-09 | End: 2025-03-14

## 2025-03-09 RX ORDER — HYDROCODONE BITARTRATE AND ACETAMINOPHEN 5; 325 MG/1; MG/1
1 TABLET ORAL EVERY 12 HOURS PRN
Qty: 7 TABLET | Refills: 0 | Status: SHIPPED | OUTPATIENT
Start: 2025-03-09

## 2025-03-09 RX ORDER — CIPROFLOXACIN 500 MG/1
500 TABLET ORAL EVERY 12 HOURS
Qty: 12 TABLET | Refills: 0 | Status: SHIPPED | OUTPATIENT
Start: 2025-03-10 | End: 2025-03-16

## 2025-03-09 RX ORDER — FERROUS SULFATE 325(65) MG
325 TABLET ORAL
Qty: 90 TABLET | Refills: 0 | Status: SHIPPED | OUTPATIENT
Start: 2025-03-09

## 2025-03-09 RX ADMIN — CEFTRIAXONE SODIUM 1 G: 1 INJECTION, POWDER, FOR SOLUTION INTRAMUSCULAR; INTRAVENOUS at 09:03

## 2025-03-09 NOTE — DISCHARGE SUMMARY
[facility] Hospital Medicine Discharge Summary    Admit Date: 3/6/2025  Discharge Date and Time: 3/9/188660:43 AM  Admitting Physician:  Team  Discharging Physician: Mitch Low MD.  Primary Care Physician: Dionne Petty NP  Consults: General Surgery    Discharge Diagnoses:  Right pyelonephritis  Cholelithiasis without cholecystitis  Normocytic anemia  Thrombocytopenia - improved  Hypokalemia     Hospital Course:   34 yr old female with no significant pmh reports a 4 day history of fever, chills and generalized body aches and one day history of constant RUQ/ flank pain. Pain waxes and wanes. No aggravating factors. Improves with pain meds. Denies any cough, SOB,  dysuria or hematuria. She went to urgent Care, where viral panel was negative however she states she was prescribed Tamiflu and sent home. However symptoms did not improve therefore she presents to our facility. She did have a recent urinary tract infection in January that grew E coli, patient states she was treated with 5 days of cefdinir. VS on arrival: T 99.8, P 107, R 18, B/P 88/54, Sats 100% on room air. Initial labs include WBC 7.49, Hgb 11, Hct 33, platelets 128, Na 136, K 3.3, BUN 11.1, creatinine 0.74, glucose 110. UPT negative. Flu and covid negative.  CT abdomen and pelvis shows findings compatible with right pyelonephritis.  Abdominal ultrasound shows cholelithiasis without evidence of acute cholecystitis. Urine cx with E. Coli.      Completed 4 days of rocephin in hospital with improvement in RUQ/ flank pain and plan to complete total 10 day of antibiotic with ciprofloxacin on discharge. Of note, had 5 UTIs in the past year, may need to consider urology evaluation outpatient for recurrent UTIs.     For cholelithiasis - seen by general surgery. No plans for surgery while inpatient, discussed with her and she would rather it be managed on outpatient basis. Follow up scheduled.      Anemia - iron studies,  fa, b12 reviewed, iron supp ordered. Thrombocytopenia improved.     Patient being discharged with medications and follow up instructions as below.    Discussed regarding ER precautions and when to return to ER if needed.  Labs in 1 week following discharge with primary care physician.    Pt was seen and examined on the day of discharge  Vitals:  VITAL SIGNS: 24 HRS MIN & MAX LAST   Temp  Min: 98.1 °F (36.7 °C)  Max: 98.6 °F (37 °C) 98.6 °F (37 °C)   BP  Min: 85/48  Max: 103/61 (!) 91/51   Pulse  Min: 75  Max: 90  86   Resp  Min: 16  Max: 18 16   SpO2  Min: 97 %  Max: 100 % 99 %       Physical Exam:  General: In no acute distress, afebrile  Chest: Clear to auscultation bilaterally  Heart: RRR, +S1, S2, no appreciable murmur  Abdomen: Soft, BS +, mild RUQ tenderness - improving  MSK: Warm, no lower extremity edema, no clubbing or cyanosis  Neurologic: Alert and oriented x4, Cranial nerve II-XII intact, Strength 5/5 in all 4 extremities    Procedures Performed: No admission procedures for hospital encounter.     Significant Diagnostic Studies: See Full reports for all details    Recent Labs   Lab 03/06/25  1349 03/07/25  0710 03/09/25  0546   WBC 7.49 4.78 5.37   RBC 3.99* 3.49* 3.78*   HGB 11.0* 9.7* 10.4*   HCT 33.1* 29.3* 31.7*   MCV 83.0 84.0 83.9   MCH 27.6 27.8 27.5   MCHC 33.2 33.1 32.8*   RDW 12.9 13.1 12.8   * 117* 151   MPV 13.2* 13.2* 11.9*       Recent Labs   Lab 03/06/25  1349 03/07/25  0710 03/09/25  0546    138 140   K 3.3* 4.2 4.1    109* 104   CO2 24 23 28   BUN 11.1 4.4* 4.0*   CREATININE 0.74 0.64 0.65   CALCIUM 9.2 8.3* 8.6   MG  --  1.80  --    ALBUMIN 3.2* 2.8*  --    ALKPHOS 77 69  --    ALT 9 11  --    AST 16 19  --    BILITOT 0.4 0.5  --         Microbiology Results (last 7 days)       Procedure Component Value Units Date/Time    Urine culture [[id number]]  (Abnormal)  (Susceptibility) Collected: 03/06/25 1350    Order Status: Completed Specimen: Urine Updated: 03/08/25 1030      Urine Culture >/= 100,000 colonies/ml Escherichia coli             CT Abdomen Pelvis With IV Contrast NO Oral Contrast  Narrative: EXAMINATION:  CT ABDOMEN PELVIS WITH IV CONTRAST    CLINICAL HISTORY:  Nausea/vomiting;Abdominal pain, acute, nonlocalized;    TECHNIQUE:  Helical acquisition through the abdomen and pelvis with IV contrast.  Three plane reconstructions were provided for review.  mGycm. Automatic exposure control, adjustment of mA/kV or iterative reconstruction technique was used to reduce radiation.    COMPARISON:  No prior CT    FINDINGS:  The limited imaged lung bases are clear.    There is no significant abnormality of the liver.  There are gallstones.  There is no pericholecystic inflammation.  No significant abnormality of the spleen, pancreas or adrenals.    There are nephrogram defects right kidney.  No hydronephrosis.  There is some urothelial thickening on the right.  There is bladder wall thickening.    No bowel obstruction. No significant inflammatory changes of the bowel.  Normal appendix.  No free air.    Trace pelvic free fluid could be physiologic.  Abdominal aorta is normal in caliber.    No acute osseous findings.  Impression: Findings compatible with pyelonephritis right kidney.    Electronically signed by: Ryan Alberto  Date:    03/06/2025  Time:    15:22  US Abdomen Limited  Narrative: EXAMINATION:  US ABDOMEN LIMITED    CLINICAL HISTORY:  RUQ abdominal pain;    COMPARISON:  01/19/2025    FINDINGS:  Proximal IVC appears normal however flow is not demonstrated.  The pancreas is incompletely visualized due to body habitus and overlying bowel gas.  An abnormality is not demonstrated.    Liver is of normal size and shape no focal lesions are demonstrated.  There are multiple cholelithiasis present without evidence of acute cholecystitis.    There is hepatopetal flow in the portal vein.  Common bile duct measures 3 mm.    The right kidney measures 10.1 x 4 by 4.5 cm there are  no focal lesions noted there is no hydronephrosis.  Impression: Cholelithiasis without evidence of acute cholecystitis.    Electronically signed by: Saul Waddell MD  Date:    03/06/2025  Time:    14:51         Medication List        START taking these medications      acetaminophen 500 MG tablet  Commonly known as: TYLENOL  Take 2 tablets (1,000 mg total) by mouth every 8 (eight) hours as needed for Pain.     ciprofloxacin HCl 500 MG tablet  Commonly known as: CIPRO  Take 1 tablet (500 mg total) by mouth every 12 (twelve) hours. for 6 days  Start taking on: March 10, 2025     ferrous sulfate 325 mg (65 mg iron) Tab tablet  Commonly known as: IRON (FERROUS SULFATE)  Take 1 tablet (325 mg total) by mouth daily with breakfast.     HYDROcodone-acetaminophen 5-325 mg per tablet  Commonly known as: NORCO  Take 1 tablet by mouth every 12 (twelve) hours as needed for Pain.            STOP taking these medications      meclizine 25 mg tablet  Commonly known as: ANTIVERT               Where to Get Your Medications        These medications were sent to [id number] - [place] - [address]  [address] [address]      Phone: [phone]   acetaminophen 500 MG tablet  ciprofloxacin HCl 500 MG tablet  ferrous sulfate 325 mg (65 mg iron) Tab tablet  HYDROcodone-acetaminophen 5-325 mg per tablet          Explained in detail to the patient about the discharge plan, medications, and follow-up visits. Pt understands and agrees with the treatment plan  Discharge Disposition: Home or Self Care   Discharged Condition: stable  Diet-   Dietary Orders (From admission, onward)       Start     Ordered    03/08/25 1425  Diet Adult Regular  Diet effective now         03/08/25 1424    03/07/25 1346  Dietary nutrition supplements TID; Boost Breeze - Orange  Continuous        Question Answer Comment   Frequency: TID    Select PO Supplement: Boost Breeze - Orange        03/07/25 1342                    Medications Per ND med rec  Activities as tolerated   Follow-up Information       [facility]. Schedule an appointment as soon as possible for a visit in 2 week(s).    Specialty: General Surgery  Why: eval for cholecystectomy; gallstones    Due to your afterhours/discharge, we were unable to schedule an appointment with your physician. You will be contacted during normal business hours with your appointment details.  Contact information:  [address] [facility] [address] [phone]             Dionne Petty NP Follow up in 1 week(s).    Specialty: Family Medicine  Why: with CBC and CMP  Due to your afterhours/discharge, we were unable to schedule an appointment with your physician. You will be contacted during normal business hours with your appointment details.  Contact information:  [address] [facility] [address] [phone]                           For further questions contact hospitalist office    Discharge time 33 minutes    For worsening symptoms, chest pain, shortness of breath, increased abdominal pain, high grade fever, stroke or stroke like symptoms, immediately go to the nearest Emergency Room or call 911 as soon as possible.      Mitch Alaniz M.D, on 3/9/2025. at 10:43 AM.

## 2025-03-11 ENCOUNTER — PATIENT MESSAGE (OUTPATIENT)
Dept: MEDSURG UNIT | Facility: HOSPITAL | Age: 35
End: 2025-03-11
Payer: MEDICAID

## 2025-03-25 ENCOUNTER — OFFICE VISIT (OUTPATIENT)
Dept: SURGERY | Facility: CLINIC | Age: 35
End: 2025-03-25
Payer: MEDICAID

## 2025-03-25 ENCOUNTER — ANESTHESIA EVENT (OUTPATIENT)
Dept: SURGERY | Facility: HOSPITAL | Age: 35
End: 2025-03-25

## 2025-03-25 VITALS
HEIGHT: 59 IN | DIASTOLIC BLOOD PRESSURE: 70 MMHG | HEART RATE: 63 BPM | OXYGEN SATURATION: 100 % | WEIGHT: 93 LBS | TEMPERATURE: 98 F | SYSTOLIC BLOOD PRESSURE: 116 MMHG | BODY MASS INDEX: 18.75 KG/M2 | RESPIRATION RATE: 19 BRPM

## 2025-03-25 DIAGNOSIS — K80.20 CALCULUS OF GALLBLADDER WITHOUT CHOLECYSTITIS WITHOUT OBSTRUCTION: ICD-10-CM

## 2025-03-25 PROCEDURE — 99215 OFFICE O/P EST HI 40 MIN: CPT | Mod: PBBFAC

## 2025-03-25 RX ORDER — CEFAZOLIN SODIUM 2 G/50ML
2 SOLUTION INTRAVENOUS
Status: CANCELLED | OUTPATIENT
Start: 2025-03-28

## 2025-03-25 RX ORDER — HEPARIN SODIUM 5000 [USP'U]/ML
5000 INJECTION, SOLUTION INTRAVENOUS; SUBCUTANEOUS
Status: CANCELLED | OUTPATIENT
Start: 2025-03-25 | End: 2025-03-25

## 2025-03-25 RX ORDER — SODIUM CHLORIDE 9 MG/ML
INJECTION, SOLUTION INTRAVENOUS CONTINUOUS
Status: CANCELLED | OUTPATIENT
Start: 2025-03-25

## 2025-03-25 NOTE — PROGRESS NOTES
[facility] Note    HPI: Patient is a 33 y/o F with no PMHx or PSHx presenting for evaluation of gallstones. She was seen in an OSH emergency department recently for pyelonephritis and imaging of her abdomen revealed gallstones. She states that she does have regular pain in her RUQ after eating meals and it is starting to affect her quality of life. She denies fevers, chills, nausea, vomiting, chest pain, or shortness of breath.     PMH: History reviewed. No pertinent past medical history.   Meds: Current Medications[1]  Allergies: Review of patient's allergies indicates:  No Known Allergies  Social History: Social History[2]  Family History:   Family History   Problem Relation Name Age of Onset    Arthritis Mother Marleny Robert     Diabetes Mother Marleny Robert     Hypertension Mother Marleny Robert     Kidney disease Mother Marleny Robert      Surgical History:   Past Surgical History:   Procedure Laterality Date     SECTION  11 and 14    TONSILLECTOMY      I was a child     Review of Systems:  Skin: No rashes or itching.  Head: Denies headache or recent trauma.  Eyes: Denies eye pain or double vision.  Neck: Denies swelling or hoarseness of voice.  Respiratory: Denies shortness of breath or chest pain  Cardiac: Denies palpitations or swelling in hands/feet.  Gastrointestinal: Denies nausea, denies vomiting.   Urinary: Denies dysuria or hematuria.  Vascular: Denies claudication or leg swelling.  Neuro: Denies motor deficits. Denies weakness.  Endocrine: Denies excessive sweating or cold intolerance.  Psych: Denies memory problems. Denies anxiety.    Objective:    Vitals:  Vitals:    25 1016   BP: 116/70   Pulse: 63   Resp: 19   Temp: 98.1 °F (36.7 °C)        Physical Exam:  Gen: NAD  Neuro: awake, alert, answering questions appropriately  CV: RRR  Resp: non-labored breathing, LIZY  Abd: soft, ND, NT  Ext: moves all 4 spontaneously and purposefully  Skin: warm, well  perfused    Imaging:  Abdominal ultrasound:  Cholelithiasis without evidence of acute cholecystitis.     Assessment/Plan:  35 y/o F with symptomatic cholelithiasis     - Will plan for OR for lap carlos on 3/28  - Surgical consents obtained    Deepali Patel MD   [facility] PGY 3  03/25/2025 10:43 AM        [1]   Current Outpatient Medications:     ferrous sulfate (IRON, FERROUS SULFATE,) 325 mg (65 mg iron) Tab tablet, Take 1 tablet (325 mg total) by mouth daily with breakfast., Disp: 90 tablet, Rfl: 0    HYDROcodone-acetaminophen (NORCO) 5-325 mg per tablet, Take 1 tablet by mouth every 12 (twelve) hours as needed for Pain., Disp: 7 tablet, Rfl: 0  [2]   Social History  Tobacco Use    Smoking status: Never    Smokeless tobacco: Never   Substance Use Topics    Alcohol use: Never    Drug use: Never

## 2025-03-25 NOTE — PROGRESS NOTES
Patient seen by Dr. Dempsey. Patient instructed to RTC in 2 weeks for post op after surgery on March 28. Consent signed.

## 2025-03-25 NOTE — ANESTHESIA PREPROCEDURE EVALUATION
Madison Robert is a 34 y.o. female presenting for CHOLECYSTECTOMY, LAPAROSCOPIC (Abdomen) with a history of   -gallstones  -anemia    BETA-BLOCKER: none    New Orders for Anesthesia: UPT    Problem List[1]    Past Surgical History:   Procedure Laterality Date     SECTION  11 and 14    TONSILLECTOMY      I was a child       Lab Results   Component Value Date    WBC 5.37 2025    HGB 10.4 (L) 2025    HCT 31.7 (L) 2025     2025       CMP  Sodium   Date Value Ref Range Status   2025 140 136 - 145 mmol/L Final     Potassium   Date Value Ref Range Status   2025 4.1 3.5 - 5.1 mmol/L Final     Chloride   Date Value Ref Range Status   2025 104 98 - 107 mmol/L Final     CO2   Date Value Ref Range Status   2025 28 22 - 29 mmol/L Final     Blood Urea Nitrogen   Date Value Ref Range Status   2025 4.0 (L) 7.0 - 18.7 mg/dL Final     Creatinine   Date Value Ref Range Status   2025 0.65 0.55 - 1.02 mg/dL Final     Calcium   Date Value Ref Range Status   2025 8.6 8.4 - 10.2 mg/dL Final     Albumin   Date Value Ref Range Status   2025 2.8 (L) 3.5 - 5.0 g/dL Final     Bilirubin Total   Date Value Ref Range Status   2025 0.5 <=1.5 mg/dL Final     ALP   Date Value Ref Range Status   2025 69 40 - 150 unit/L Final     AST   Date Value Ref Range Status   2025 19 5 - 34 unit/L Final     ALT   Date Value Ref Range Status   2025 11 0 - 55 unit/L Final     eGFR   Date Value Ref Range Status   2025 >60 mL/min/1.73/m2 Final     Comment:     Estimated GFR calculated using the CKD-EPI creatinine () equation.           Current Outpatient Medications   Medication Instructions    ferrous sulfate (IRON (FERROUS SULFATE)) 325 mg, Oral, With breakfast    HYDROcodone-acetaminophen (NORCO) 5-325 mg per tablet 1 tablet, Oral, Every 12 hours PRN         Pre-op Assessment          Review of Systems         Anesthesia Plan  Type  of Anesthesia, risks & benefits discussed:    Anesthesia Type: Gen ETT  ASA Score: 1    .           [1]   Patient Active Problem List  Diagnosis    Moderate malnutrition    Pyelonephritis of right kidney    Calculus of gallbladder without cholecystitis without obstruction

## 2025-03-25 NOTE — PROGRESS NOTES
I have reviewed the notes, assessments, and/or procedures performed by Dr Patel, I concur with her/his documentation of Madison Robert.  Date of Service: 3/25/2025    [facility]

## 2025-03-25 NOTE — PROGRESS NOTES
I have reviewed the notes, assessments, and/or procedures performed by Dr Patel, I concur with her/his documentation of Madison Robert.  Date of Service: 3/25/2025    [facility]

## 2025-03-27 ENCOUNTER — PATIENT MESSAGE (OUTPATIENT)
Dept: SURGERY | Facility: HOSPITAL | Age: 35
End: 2025-03-27
Payer: MEDICAID

## 2025-03-27 RX ORDER — OXYCODONE AND ACETAMINOPHEN 5; 325 MG/1; MG/1
2 TABLET ORAL ONCE
Refills: 0 | OUTPATIENT
Start: 2025-03-27 | End: 2025-03-27

## 2025-03-27 RX ORDER — IPRATROPIUM BROMIDE AND ALBUTEROL SULFATE 2.5; .5 MG/3ML; MG/3ML
3 SOLUTION RESPIRATORY (INHALATION) ONCE AS NEEDED
OUTPATIENT
Start: 2025-03-27 | End: 2036-08-23

## 2025-03-27 RX ORDER — PROCHLORPERAZINE EDISYLATE 5 MG/ML
5 INJECTION INTRAMUSCULAR; INTRAVENOUS ONCE AS NEEDED
OUTPATIENT
Start: 2025-03-27 | End: 2036-08-23

## 2025-03-27 RX ORDER — MEPERIDINE HYDROCHLORIDE 25 MG/ML
12.5 INJECTION INTRAMUSCULAR; INTRAVENOUS; SUBCUTANEOUS ONCE
Refills: 0 | OUTPATIENT
Start: 2025-03-27 | End: 2025-03-27

## 2025-03-27 RX ORDER — DIPHENHYDRAMINE HYDROCHLORIDE 50 MG/ML
25 INJECTION, SOLUTION INTRAMUSCULAR; INTRAVENOUS ONCE AS NEEDED
OUTPATIENT
Start: 2025-03-27 | End: 2036-08-23

## 2025-03-27 RX ORDER — GLUCAGON 1 MG
1 KIT INJECTION
OUTPATIENT
Start: 2025-03-27

## 2025-03-27 RX ORDER — ONDANSETRON HYDROCHLORIDE 2 MG/ML
4 INJECTION, SOLUTION INTRAVENOUS ONCE
OUTPATIENT
Start: 2025-03-27 | End: 2025-03-27

## 2025-03-27 RX ORDER — HYDROMORPHONE HYDROCHLORIDE 1 MG/ML
0.2 INJECTION, SOLUTION INTRAMUSCULAR; INTRAVENOUS; SUBCUTANEOUS EVERY 5 MIN PRN
Refills: 0 | OUTPATIENT
Start: 2025-03-27

## 2025-03-27 RX ORDER — HYDROMORPHONE HYDROCHLORIDE 1 MG/ML
0.5 INJECTION, SOLUTION INTRAMUSCULAR; INTRAVENOUS; SUBCUTANEOUS EVERY 5 MIN PRN
Refills: 0 | OUTPATIENT
Start: 2025-03-27

## 2025-03-28 ENCOUNTER — HOSPITAL ENCOUNTER (EMERGENCY)
Facility: HOSPITAL | Age: 35
Discharge: HOME | End: 2025-03-28
Attending: EMERGENCY MEDICINE
Payer: MEDICAID

## 2025-03-28 ENCOUNTER — HOSPITAL ENCOUNTER (OUTPATIENT)
Facility: HOSPITAL | Age: 35
Discharge: HOME | End: 2025-03-28
Attending: SURGERY | Admitting: SURGERY
Payer: MEDICAID

## 2025-03-28 ENCOUNTER — ANESTHESIA (OUTPATIENT)
Dept: SURGERY | Facility: HOSPITAL | Age: 35
End: 2025-03-28

## 2025-03-28 VITALS
TEMPERATURE: 98 F | BODY MASS INDEX: 19.15 KG/M2 | DIASTOLIC BLOOD PRESSURE: 82 MMHG | HEART RATE: 81 BPM | WEIGHT: 94.81 LBS | OXYGEN SATURATION: 100 % | SYSTOLIC BLOOD PRESSURE: 129 MMHG

## 2025-03-28 VITALS
OXYGEN SATURATION: 100 % | HEART RATE: 74 BPM | SYSTOLIC BLOOD PRESSURE: 110 MMHG | RESPIRATION RATE: 13 BRPM | BODY MASS INDEX: 19.15 KG/M2 | TEMPERATURE: 98 F | HEIGHT: 59 IN | DIASTOLIC BLOOD PRESSURE: 67 MMHG | WEIGHT: 95 LBS

## 2025-03-28 DIAGNOSIS — K80.20 SYMPTOMATIC CHOLELITHIASIS: Primary | ICD-10-CM

## 2025-03-28 DIAGNOSIS — R10.11 RIGHT UPPER QUADRANT PAIN: ICD-10-CM

## 2025-03-28 DIAGNOSIS — Z32.00 POSSIBLE PREGNANCY: ICD-10-CM

## 2025-03-28 DIAGNOSIS — K80.20 CALCULUS OF GALLBLADDER WITHOUT CHOLECYSTITIS WITHOUT OBSTRUCTION: ICD-10-CM

## 2025-03-28 LAB
ALBUMIN SERPL-MCNC: 3.9 G/DL (ref 3.5–5)
ALBUMIN/GLOB SERPL: 1.1 RATIO (ref 1.1–2)
ALP SERPL-CCNC: 68 UNIT/L (ref 40–150)
ALT SERPL-CCNC: 10 UNIT/L (ref 0–55)
AMORPH URATE CRY URNS QL MICRO: ABNORMAL /UL
ANION GAP SERPL CALC-SCNC: 5 MEQ/L
AST SERPL-CCNC: 16 UNIT/L (ref 11–45)
B-HCG FREE SERPL-ACNC: 6.61 MIU/ML
B-HCG FREE SERPL-ACNC: 8.06 MIU/ML
B-HCG UR QL: NEGATIVE
B-HCG UR QL: POSITIVE
BACTERIA #/AREA URNS AUTO: ABNORMAL /HPF
BASOPHILS # BLD AUTO: 0.01 X10(3)/MCL
BASOPHILS NFR BLD AUTO: 0.2 %
BILIRUB SERPL-MCNC: 0.4 MG/DL
BILIRUB UR QL STRIP.AUTO: NEGATIVE
BUN SERPL-MCNC: 11 MG/DL (ref 7–18.7)
CALCIUM SERPL-MCNC: 9.2 MG/DL (ref 8.4–10.2)
CHLORIDE SERPL-SCNC: 107 MMOL/L (ref 98–107)
CLARITY UR: ABNORMAL
CO2 SERPL-SCNC: 27 MMOL/L (ref 22–29)
COLOR UR AUTO: ABNORMAL
CREAT SERPL-MCNC: 0.77 MG/DL (ref 0.55–1.02)
CREAT/UREA NIT SERPL: 14
CTP QC/QA: YES
EOSINOPHIL # BLD AUTO: 0.03 X10(3)/MCL (ref 0–0.9)
EOSINOPHIL NFR BLD AUTO: 0.7 %
ERYTHROCYTE [DISTWIDTH] IN BLOOD BY AUTOMATED COUNT: 13.4 % (ref 11.5–17)
GFR SERPLBLD CREATININE-BSD FMLA CKD-EPI: >60 ML/MIN/1.73/M2
GLOBULIN SER-MCNC: 3.7 GM/DL (ref 2.4–3.5)
GLUCOSE SERPL-MCNC: 86 MG/DL (ref 74–100)
GLUCOSE UR QL STRIP: NORMAL
HCT VFR BLD AUTO: 34.9 % (ref 37–47)
HGB BLD-MCNC: 11.1 G/DL (ref 12–16)
HGB UR QL STRIP: NEGATIVE
IMM GRANULOCYTES # BLD AUTO: 0.01 X10(3)/MCL (ref 0–0.04)
IMM GRANULOCYTES NFR BLD AUTO: 0.2 %
KETONES UR QL STRIP: NEGATIVE
LEUKOCYTE ESTERASE UR QL STRIP: NEGATIVE
LIPASE SERPL-CCNC: 16 U/L
LYMPHOCYTES # BLD AUTO: 1.81 X10(3)/MCL (ref 0.6–4.6)
LYMPHOCYTES NFR BLD AUTO: 44.3 %
MCH RBC QN AUTO: 27 PG (ref 27–31)
MCHC RBC AUTO-ENTMCNC: 31.8 G/DL (ref 33–36)
MCV RBC AUTO: 84.9 FL (ref 80–94)
MONOCYTES # BLD AUTO: 0.28 X10(3)/MCL (ref 0.1–1.3)
MONOCYTES NFR BLD AUTO: 6.8 %
MUCOUS THREADS URNS QL MICRO: ABNORMAL /LPF
NEUTROPHILS # BLD AUTO: 1.95 X10(3)/MCL (ref 2.1–9.2)
NEUTROPHILS NFR BLD AUTO: 47.8 %
NITRITE UR QL STRIP: NEGATIVE
NRBC BLD AUTO-RTO: 0 %
PH UR STRIP: 8 [PH]
PLATELET # BLD AUTO: 135 X10(3)/MCL (ref 130–400)
PLATELETS.RETICULATED NFR BLD AUTO: 12.9 % (ref 0.9–11.2)
PMV BLD AUTO: 13.5 FL (ref 7.4–10.4)
POTASSIUM SERPL-SCNC: 3.4 MMOL/L (ref 3.5–5.1)
PROT SERPL-MCNC: 7.6 GM/DL (ref 6.4–8.3)
PROT UR QL STRIP: NEGATIVE
RBC # BLD AUTO: 4.11 X10(6)/MCL (ref 4.2–5.4)
RBC #/AREA URNS AUTO: ABNORMAL /HPF
SODIUM SERPL-SCNC: 139 MMOL/L (ref 136–145)
SP GR UR STRIP.AUTO: 1.01 (ref 1–1.03)
SQUAMOUS #/AREA URNS LPF: ABNORMAL /HPF
UROBILINOGEN UR STRIP-ACNC: NORMAL
WBC # BLD AUTO: 4.09 X10(3)/MCL (ref 4.5–11.5)
WBC #/AREA URNS AUTO: ABNORMAL /HPF

## 2025-03-28 PROCEDURE — 83690 ASSAY OF LIPASE: CPT

## 2025-03-28 PROCEDURE — 85025 COMPLETE CBC W/AUTO DIFF WBC: CPT

## 2025-03-28 PROCEDURE — 96375 TX/PRO/DX INJ NEW DRUG ADDON: CPT

## 2025-03-28 PROCEDURE — 81025 URINE PREGNANCY TEST: CPT | Performed by: NURSE PRACTITIONER

## 2025-03-28 PROCEDURE — 84702 CHORIONIC GONADOTROPIN TEST: CPT | Performed by: SURGERY

## 2025-03-28 PROCEDURE — 96374 THER/PROPH/DIAG INJ IV PUSH: CPT

## 2025-03-28 PROCEDURE — 84702 CHORIONIC GONADOTROPIN TEST: CPT | Performed by: EMERGENCY MEDICINE

## 2025-03-28 PROCEDURE — 81025 URINE PREGNANCY TEST: CPT

## 2025-03-28 PROCEDURE — 99285 EMERGENCY DEPT VISIT HI MDM: CPT | Mod: 25

## 2025-03-28 PROCEDURE — 81001 URINALYSIS AUTO W/SCOPE: CPT

## 2025-03-28 PROCEDURE — 80053 COMPREHEN METABOLIC PANEL: CPT

## 2025-03-28 PROCEDURE — 63600175 PHARM REV CODE 636 W HCPCS: Performed by: EMERGENCY MEDICINE

## 2025-03-28 RX ORDER — ONDANSETRON HYDROCHLORIDE 2 MG/ML
4 INJECTION, SOLUTION INTRAVENOUS
Status: COMPLETED | OUTPATIENT
Start: 2025-03-28 | End: 2025-03-28

## 2025-03-28 RX ORDER — HEPARIN SODIUM 5000 [USP'U]/ML
5000 INJECTION, SOLUTION INTRAVENOUS; SUBCUTANEOUS
Status: DISCONTINUED | OUTPATIENT
Start: 2025-03-28 | End: 2025-03-28 | Stop reason: HOSPADM

## 2025-03-28 RX ORDER — SODIUM CHLORIDE, SODIUM LACTATE, POTASSIUM CHLORIDE, CALCIUM CHLORIDE 600; 310; 30; 20 MG/100ML; MG/100ML; MG/100ML; MG/100ML
INJECTION, SOLUTION INTRAVENOUS CONTINUOUS
Status: DISCONTINUED | OUTPATIENT
Start: 2025-03-28 | End: 2025-03-28 | Stop reason: HOSPADM

## 2025-03-28 RX ORDER — MIDAZOLAM HYDROCHLORIDE 2 MG/2ML
2 INJECTION, SOLUTION INTRAMUSCULAR; INTRAVENOUS ONCE
Status: DISCONTINUED | OUTPATIENT
Start: 2025-03-28 | End: 2025-03-28 | Stop reason: HOSPADM

## 2025-03-28 RX ORDER — SODIUM CHLORIDE 9 MG/ML
INJECTION, SOLUTION INTRAVENOUS CONTINUOUS
Status: DISCONTINUED | OUTPATIENT
Start: 2025-03-28 | End: 2025-03-28 | Stop reason: HOSPADM

## 2025-03-28 RX ORDER — CEFAZOLIN SODIUM 1 G/3ML
2 INJECTION, POWDER, FOR SOLUTION INTRAMUSCULAR; INTRAVENOUS
Status: DISCONTINUED | OUTPATIENT
Start: 2025-03-28 | End: 2025-03-28 | Stop reason: HOSPADM

## 2025-03-28 RX ORDER — MORPHINE SULFATE 4 MG/ML
4 INJECTION, SOLUTION INTRAMUSCULAR; INTRAVENOUS
Refills: 0 | Status: COMPLETED | OUTPATIENT
Start: 2025-03-28 | End: 2025-03-28

## 2025-03-28 RX ORDER — HYDROCODONE BITARTRATE AND ACETAMINOPHEN 5; 325 MG/1; MG/1
1 TABLET ORAL EVERY 6 HOURS PRN
Qty: 12 TABLET | Refills: 0 | Status: SHIPPED | OUTPATIENT
Start: 2025-03-28

## 2025-03-28 RX ORDER — ONDANSETRON 4 MG/1
4 TABLET, ORALLY DISINTEGRATING ORAL EVERY 6 HOURS PRN
Qty: 20 TABLET | Refills: 0 | Status: SHIPPED | OUTPATIENT
Start: 2025-03-28

## 2025-03-28 RX ADMIN — ONDANSETRON 4 MG: 2 INJECTION INTRAMUSCULAR; INTRAVENOUS at 11:03

## 2025-03-28 RX ADMIN — MORPHINE SULFATE 4 MG: 4 INJECTION INTRAVENOUS at 11:03

## 2025-03-28 NOTE — DISCHARGE INSTRUCTIONS
Because of your positive blood pregnancy test, surgery wants a negative test before they can do surgery. Please follow up with your primary care provider for repeat testing and further evaluation of your hormone level or an obgyn of your choice. If your pregnancy tests remain negative, you can take ibuprofen

## 2025-03-28 NOTE — CONSULTS
Acute Care Surgery   Consult    Patient Name: Madison Robert  YOB: 1990  Date: 2025 12:56 PM  Date of Admission: 3/28/2025  HD#0  POD#* No surgery found *    PRESENTING HISTORY   Chief Complaint/Reason for Admission: <principal problem not specified>  History source(s): patient and chart   History of Present Illness:  39F w/ cholelithiasis recently seen in [facility] clinic and wishing to have gallbladder removed w/ cancellation of lap carlos scheduled today () at [facility] canceled for +pregnancy test.  Of note, followed by General Surgery [facility] team here earlier in the month for pyelonephritis w/ r/o of acute cholecystitis.    On interview today () she says that she is still having episodic RUQ pain which lasts about an hour at a time and occurs about x3-4 a day w/ meals. No other current symptoms and no chest pain, shortness of breath, nausea, vomiting, constipation, diarrhea, or fever. Of note, she says that she gets frequent UTI's, about x4 a year. She is not sure if she is pregnant yet and says that she has had a positive and negative test.     She does not have any significant medical history and does not take any medications. She has never smoked and does not drink alcohol, no other drug use. Surgical history consists of 2 C-sections through a pfannenstiel incision. No allergies.    Review of Systems:  12 point ROS negative except as stated in HPI    PAST HISTORY:   Past medical history:  No past medical history on file.    Past surgical history:  Past Surgical History:   Procedure Laterality Date     SECTION  11 and 14    TONSILLECTOMY      I was a child       Family history:  Family History   Problem Relation Name Age of Onset    Arthritis Mother Marleny Robert     Diabetes Mother Marleny Robert     Hypertension Mother Marleny Robert     Kidney disease Mother Marleny Robert        Social history:  Social History     Socioeconomic History     Marital status: Single   Tobacco Use    Smoking status: Never    Smokeless tobacco: Never   Substance and Sexual Activity    Alcohol use: Never    Drug use: Never    Sexual activity: Yes     Partners: Male     Birth control/protection: Condom, Partner-Vasectomy     Social Drivers of Health     Financial Resource Strain: Low Risk  (3/6/2025)    Overall Financial Resource Strain (CARDIA)     Difficulty of Paying Living Expenses: Not hard at all   Food Insecurity: No Food Insecurity (3/7/2025)    Hunger Vital Sign     Worried About Running Out of Food in the Last Year: Never true     Ran Out of Food in the Last Year: Never true   Stress: No Stress Concern Present (3/6/2025)    [employer] - Occupational Stress Questionnaire     Feeling of Stress : Not at all   Housing Stability: Low Risk  (3/7/2025)    Housing Stability Vital Sign     Unable to Pay for Housing in the Last Year: No     Homeless in the Last Year: No     Tobacco Use History[1]   Social History     Substance and Sexual Activity   Alcohol Use Never        MEDICATIONS & ALLERGIES:     Current Facility-Administered Medications on File Prior to Encounter   Medication    [DISCONTINUED] 0.9% NaCl infusion    [DISCONTINUED] ceFAZolin injection 2 g    [DISCONTINUED] heparin (porcine) injection 5,000 Units    [DISCONTINUED] lactated ringers infusion    [DISCONTINUED] midazolam (PF) (VERSED) 1 mg/mL injection 2 mg     Current Outpatient Medications on File Prior to Encounter   Medication Sig    ferrous sulfate (IRON, FERROUS SULFATE,) 325 mg (65 mg iron) Tab tablet Take 1 tablet (325 mg total) by mouth daily with breakfast.    HYDROcodone-acetaminophen (NORCO) 5-325 mg per tablet Take 1 tablet by mouth every 12 (twelve) hours as needed for Pain.     Allergies: Review of patient's allergies indicates:  No Known Allergies  Scheduled Meds:  Continuous Infusions:  PRN Meds:    OBJECTIVE:   Vital Signs:  VITAL SIGNS: 24 HR MIN & MAX LAST  "  Temp  Min: 98 °F (36.7 °C)  Max: 98.1 °F (36.7 °C)  98.1 °F (36.7 °C)   BP  Min: 111/72  Max: 129/82  111/72    Pulse  Min: 66  Max: 92  66    Resp  Min: 11  Max: 19  11    SpO2  Min: 100 %  Max: 100 %  100 %      HT: 4' 11" (149.9 cm)  WT: 43.1 kg (95 lb)  BMI: 19.2     Intake/output:  Intake/Output - Last 3 Shifts       None          No intake or output data in the 24 hours ending 03/28/25 1256      Physical Exam:  General: Well developed, well nourished, no acute distress; no scleral icterus  HEENT: Normocephalic, PERRL  CV: RR  Resp: NWOB on RA  GI:  Abdomen soft, non-tender, non-distended, no guarding, no rebound; Santos (-)  :  Deferred  MSK: No muscle atrophy, cyanosis, peripheral edema, moving all extremities spontaneously  Skin/wounds:  No rashes, ulcers, erythema  Neuro:  CNII-XII grossly intact, alert and oriented to person, place, and time    Labs:  Troponin:  No results for input(s): "TROPONINI" in the last 72 hours.  CBC:  Recent Labs     03/28/25  1115   WBC 4.09*   RBC 4.11*   HGB 11.1*   HCT 34.9*      MCV 84.9   MCH 27.0   MCHC 31.8*     CMP:  Recent Labs     03/28/25  1115   CALCIUM 9.2   ALBUMIN 3.9      K 3.4*   CO2 27      BUN 11.0   CREATININE 0.77   ALKPHOS 68   ALT 10   AST 16   BILITOT 0.4     Lactic Acid:  No results for input(s): "LACTATE" in the last 72 hours.  ETOH:  No results for input(s): "ETHANOL" in the last 72 hours.   Urine Drug Screen:  No results for input(s): "COCAINE", "OPIATE", "BARBITURATE", "AMPHETAMINE", "FENTANYL", "CANNABINOIDS", "MDMA" in the last 72 hours.    Invalid input(s): "BENZODIAZEPINE", "PHENCYCLIDINE"   ABG:  No results for input(s): "PH", "PO2", "PCO2", "HCO3", "BE" in the last 168 hours.   I have reviewed all pertinent lab results within the past 24 hours.    Diagnostic Results:  Imaging Results              US Abdomen Limited (Gallbladder) (Final result)  Result time 03/28/25 12:20:37      Final result by Alex St MD " (03/28/25 12:20:37)                   Impression:      Cholelithiasis.  No biliary dilatation or evidence of cholecystitis.      Electronically signed by: Alex St  Date:    03/28/2025  Time:    12:20               Narrative:    EXAMINATION:  US ABDOMEN LIMITED    CLINICAL HISTORY:  Right upper quadrant pain    TECHNIQUE:  Gray-scale and color Doppler ultrasound images of the abdomen.    COMPARISON:  Ultrasound 03/06/2025    FINDINGS:  Liver upper normal in size with normal hepatic echogenicity.  Main portal vein patent with normal flow direction.    Pancreas partially obscured with no abnormality identified as imaged.  Normal caliber of the superior IVC.    No right-sided hydronephrosis.  No significant ascites.    Normal CBD caliber.  Multiple gallstones.  Negative sonographic Santos sign.  No gallbladder wall thickening.    Measurements:    - Liver: 16.5 cm    - CBD diameter: 2 mm    - Right kidney: 8.6 cm in length                                     I have reviewed all pertinent imaging results/findings within the past 24 hours.    ASSESSMENT & PLAN:    39F w/ known cholelithiasis re-demonstrated on RUQ US, biliary colic, & preg(+) here after cancellation of her cholecystectomy. General Surgery c/f r/o acute cholecystitis. B-hCG 6.61 today (03/28). Labs, imaging, and exam inconsistent w/ acute cholecystitis.     - no concern for acute cholecystitis and no acute surgical intervention at this time  - patient is to follow-up at [facility] outpatient for rescheduling of cholecystectomy   - recommend following up w/ OBGYN for follow-up of B-hCG (+) result  - further disposition per ED      Yuri Jameson MD  [facility] General Surgery PGY1       [1]   Social History  Tobacco Use   Smoking Status Never   Smokeless Tobacco Never

## 2025-03-28 NOTE — FIRST PROVIDER EVALUATION
"Medical screening examination initiated.  I have conducted a focused provider triage encounter, findings are as follows:    Brief history of present illness:  arrived to ED due to RUQ abdominal pain. Scheduled for cholecystectomy on today but cancelled due to positive pregnancy test. Patient reports she went home and her pregnancy test was negative.     Vitals:    03/28/25 1100   BP: 111/76   Pulse: 92   Resp: 19   Temp: 98.1 °F (36.7 °C)   TempSrc: Oral   SpO2: 100%   Weight: 43.1 kg (95 lb)   Height: 4' 11" (1.499 m)       Pertinent physical exam:  awake, alert, has non-labored breathing, ambulatory into triage    Brief workup plan:  labs     Preliminary workup initiated; this workup will be continued and followed by the physician or advanced practice provider that is assigned to the patient when roomed.  "

## 2025-03-28 NOTE — ED PROVIDER NOTES
Encounter Date: 3/28/2025    SCRIBE #1 NOTE: I, Ricki Coronado, am scribing for, and in the presence of,  Theodora Cox MD. Other sections scribed: HPI, ROS, and PE.       History     Chief Complaint   Patient presents with    Abdominal Pain     RUQ abd pain x 1 month. States was supposed to have gallbladder removed today but was told she couldn't have the SX d/t positive pregnancy test. Unknown LMP.      Madison Robert is a 34 y.o. female patient with no known PMHx who presents to the ED for evaluation of RUQ abdominal pain which onset gradually 1 month ago. The abdominal pain is worsened with eating. She was scheduled to have cholecystectomy today at [facility] but was cancelled due to positive pregnancy blood test. Her lab results showed a quant <10. She states she took a pregnancy test at home after and it was negative. States her last period was in , was on Depo provera birth control. She mentions following with OBGYN before to help induce periods. She rates her pain a 7/10 which improves with Norco use. Associated symptoms include nausea. Patient denies any vomiting and fevers.       The history is provided by the patient. No  was used.     Review of patient's allergies indicates:  No Known Allergies  History reviewed. No pertinent past medical history.  Past Surgical History:   Procedure Laterality Date     SECTION  11 and 14    TONSILLECTOMY      I was a child     Family History   Problem Relation Name Age of Onset    Arthritis Mother Marleny Robert     Diabetes Mother Marleny Robert     Hypertension Mother Marleny Robert     Kidney disease Mother Marleny Robert      Social History[1]  Review of Systems   Constitutional:  Negative for fever.   Gastrointestinal:  Positive for abdominal pain (RUQ) and nausea. Negative for vomiting.       Physical Exam     Initial Vitals [25 1100]   BP Pulse Resp Temp SpO2   111/76 92 19 98.1 °F (36.7 °C) 100 %      MAP        --         Physical Exam    Nursing note and vitals reviewed.  Constitutional: She appears well-developed and well-nourished. She is not diaphoretic. No distress.   HENT:   Head: Normocephalic and atraumatic.   Nose: Nose normal. Mouth/Throat: Oropharynx is clear and moist.   Eyes: Conjunctivae and EOM are normal. Pupils are equal, round, and reactive to light.   Neck: Trachea normal. Neck supple.   Normal range of motion.  Cardiovascular:  Normal rate, regular rhythm, normal heart sounds and intact distal pulses.           No murmur heard.  Pulmonary/Chest: Breath sounds normal. No respiratory distress. She has no wheezes. She has no rhonchi. She has no rales. She exhibits no tenderness.   Abdominal: Abdomen is soft. Bowel sounds are normal. She exhibits no distension and no mass. There is abdominal tenderness in the right upper quadrant. There is positive Santos's sign. There is no rebound and no guarding.   Musculoskeletal:         General: No tenderness or edema. Normal range of motion.      Cervical back: Normal range of motion and neck supple.      Lumbar back: Normal. Normal range of motion.     Neurological: She is alert and oriented to person, place, and time. She has normal strength. No cranial nerve deficit or sensory deficit.   Skin: Skin is warm and dry. Capillary refill takes less than 2 seconds. No abscess noted. No erythema. No pallor.   Psychiatric: She has a normal mood and affect. Her behavior is normal. Judgment and thought content normal.         ED Course   Procedures  Labs Reviewed   COMPREHENSIVE METABOLIC PANEL - Abnormal       Result Value    Sodium 139      Potassium 3.4 (*)     Chloride 107      CO2 27      Glucose 86      Blood Urea Nitrogen 11.0      Creatinine 0.77      Calcium 9.2      Protein Total 7.6      Albumin 3.9      Globulin 3.7 (*)     Albumin/Globulin Ratio 1.1      Bilirubin Total 0.4      ALP 68      ALT 10      AST 16      eGFR >60      Anion Gap 5.0       BUN/Creatinine Ratio 14     URINALYSIS, REFLEX TO URINE CULTURE - Abnormal    Color, UA Light-Yellow      Appearance, UA Turbid (*)     Specific Gravity, UA 1.013      pH, UA 8.0      Protein, UA Negative      Glucose, UA Normal      Ketones, UA Negative      Blood, UA Negative      Bilirubin, UA Negative      Urobilinogen, UA Normal      Nitrites, UA Negative      Leukocyte Esterase, UA Negative      RBC, UA 0-5      WBC, UA 0-5      Bacteria, UA None Seen      Squamous Epithelial Cells, UA Trace      Mucous, UA Trace (*)     Amorphous Crystal, UA Occasional (*)    CBC WITH DIFFERENTIAL - Abnormal    WBC 4.09 (*)     RBC 4.11 (*)     Hgb 11.1 (*)     Hct 34.9 (*)     MCV 84.9      MCH 27.0      MCHC 31.8 (*)     RDW 13.4      Platelet 135      MPV 13.5 (*)     IPF 12.9 (*)     Neut % 47.8      Lymph % 44.3      Mono % 6.8      Eos % 0.7      Basophil % 0.2      Imm Grans % 0.2      Neut # 1.95 (*)     Lymph # 1.81      Mono # 0.28      Eos # 0.03      Baso # 0.01      Imm Gran # 0.01      NRBC% 0.0     HCG, QUANTITATIVE - Abnormal    Beta HCG Quant 6.61 (*)    PREGNANCY TEST, URINE RAPID - Normal    hCG Qualitative, Urine Negative     LIPASE - Normal    Lipase Level 16     CBC W/ AUTO DIFFERENTIAL    Narrative:     The following orders were created for panel order CBC W/ AUTO DIFFERENTIAL.  Procedure                               Abnormality         Status                     ---------                               -----------         ------                     CBC with Differential[[id number]]       Abnormal            Final result                 Please view results for these tests on the individual orders.          Imaging Results              US Abdomen Limited (Gallbladder) (Final result)  Result time 03/28/25 12:20:37      Final result by Alex St MD (03/28/25 12:20:37)                   Impression:      Cholelithiasis.  No biliary dilatation or evidence of cholecystitis.      Electronically signed  by: Alex St  Date:    03/28/2025  Time:    12:20               Narrative:    EXAMINATION:  US ABDOMEN LIMITED    CLINICAL HISTORY:  Right upper quadrant pain    TECHNIQUE:  Gray-scale and color Doppler ultrasound images of the abdomen.    COMPARISON:  Ultrasound 03/06/2025    FINDINGS:  Liver upper normal in size with normal hepatic echogenicity.  Main portal vein patent with normal flow direction.    Pancreas partially obscured with no abnormality identified as imaged.  Normal caliber of the superior IVC.    No right-sided hydronephrosis.  No significant ascites.    Normal CBD caliber.  Multiple gallstones.  Negative sonographic Santos sign.  No gallbladder wall thickening.    Measurements:    - Liver: 16.5 cm    - CBD diameter: 2 mm    - Right kidney: 8.6 cm in length                                       Medications   morphine injection 4 mg (4 mg Intravenous Given 3/28/25 1139)   ondansetron injection 4 mg (4 mg Intravenous Given 3/28/25 1139)     Medical Decision Making  The differential diagnosis includes, but is not limited to, symptomatic cholelithiasis, early pregnancy, and lab error.   Cbc, cmp, hcg, ua, upt, abd us ordered and reviewed  Low quant, negative upt, lower suspicion for pregnancy but possible  Surgery saw, outpt management, discussed repeat labs and further eval with pcp and obgyn and she voiced understanding and agreesa nd is comfortable with plan    Problems Addressed:  Possible pregnancy: undiagnosed new problem with uncertain prognosis  Right upper quadrant pain: acute illness or injury that poses a threat to life or bodily functions  Symptomatic cholelithiasis: acute illness or injury that poses a threat to life or bodily functions    Amount and/or Complexity of Data Reviewed  External Data Reviewed: labs.     Details: Reviewed lab from [facility] where her surgery was scheduled, her quant was <10  Labs: ordered. Decision-making details documented in ED Course.  Radiology:  ordered.    Risk  OTC drugs.  Prescription drug management.  Parenteral controlled substances.  Decision regarding hospitalization.            Scribe Attestation:   Scribe #1: I performed the above scribed service and the documentation accurately describes the services I performed. I attest to the accuracy of the note.  Comments: Attending:   Physician Attestation Statement for Scribe #1: Theodora FRANCOIS MD, personally performed the services described in this documentation. All medical record entries made by the scribe were at my direction and in my presence.  I have reviewed the chart and agree that the record reflects my personal performance and is accurate and complete.        Attending Attestation:           Physician Attestation for Scribe:  Physician Attestation Statement for Scribe #1: Kenny FRANCOIS Brooke R, MD, reviewed documentation, as scribed by Ricki Coronado in my presence, and it is both accurate and complete.             ED Course as of 03/28/25 1620   Fri Mar 28, 2025   1109 Reviewed lab from [facility] where her surgery was scheduled, her quant was <10 [BS]   1220 Has been having right upper quadrant pain since January per chart review, her hcg is only 6  [BS]   1226 Paged gen surg. [GG]   1300 hCG Qualitative, Urine: Negative [BS]   1342 Discussed with surgery resident dr gonzalez who saw pt, low suspicion for cholecystitis which I agree, will discuss with staff to determine next steps [BS]   1555 Surgery resident recommends outpt management at this time [BS]      ED Course User Index  [BS] Theodora Cox MD  [GG] Ricki Coronado                           Clinical Impression:  Final diagnoses:  [R10.11] Right upper quadrant pain  [K80.20] Symptomatic cholelithiasis (Primary)  [Z32.00] Possible pregnancy          ED Disposition Condition    Discharge Stable          ED Prescriptions       Medication Sig Dispense Start Date End Date Auth. Provider    HYDROcodone-acetaminophen (NORCO) 5-325 mg per tablet  Take 1 tablet by mouth every 6 (six) hours as needed for Pain. 12 tablet 3/28/2025 -- Theodora Cox MD    ondansetron (ZOFRAN-ODT) 4 MG TbDL Take 1 tablet (4 mg total) by mouth every 6 (six) hours as needed (nausea). 20 tablet 3/28/2025 -- Theodora Cox MD          Follow-up Information       Follow up With Specialties Details Why Contact Info    Dionne Petty NP Family Medicine Schedule an appointment as soon as possible for a visit   [address] [facility] [address]  [phone]      [facility]  Schedule an appointment as soon as possible for a visit   [address] [place] [id number] [phone]      [facility] - Emergency Dept Emergency Medicine  As needed, If symptoms worsen [address] [address] [phone]    Wojciech Cui MD General Surgery   [address]  [address]  [facility] [address]  [phone]                 [1]   Social History  Tobacco Use    Smoking status: Never    Smokeless tobacco: Never   Substance Use Topics    Alcohol use: Never    Drug use: Never        Theodora Cox MD  03/28/25 1627

## 2025-03-28 NOTE — PROGRESS NOTES
Patient given discharge instructions and verbalized understanding. Pt to follow up with her GYN doctor. Pt will follow up with gen surgery prn. Pt ambulated from room with safety maintained.

## 2025-03-28 NOTE — DISCHARGE SUMMARY
[facility] - Periop Services  Discharge Note  Short Stay    Procedure(s) (LRB):  CHOLECYSTECTOMY, LAPAROSCOPIC (N/A) CANCELLED       OUTCOME: Operation was cancelled due to positive pregnancy test    DISPOSITION: Home or Self Care    FINAL DIAGNOSIS:  <principal problem not specified>    FOLLOWUP: With primary care provider Follow up in general surgery PRN.    DISCHARGE INSTRUCTIONS:  None    TIME SPENT ON DISCHARGE: 15 minutes    Gloria Morocho MD  [employer] General Surgery PGY-1  
DISCHARGE

## (undated) DEVICE — MANIFOLD 4 PORT

## (undated) DEVICE — KIT LAPAROSCOPY UNIVERSITY

## (undated) DEVICE — SOL IRRI STRL WATER 1000ML

## (undated) DEVICE — SUT VICRYL+ 27 UR-6 VIOL

## (undated) DEVICE — KIT SURGICAL TURNOVER

## (undated) DEVICE — NDL HYPO REG 25G X 1 1/2

## (undated) DEVICE — POSITIONER HEEL FOAM CONVOLTD

## (undated) DEVICE — ELECTRODE PATIENT RETURN DISP

## (undated) DEVICE — SCISSOR 5MMX35CM DIRECT DRIVE

## (undated) DEVICE — ADHESIVE DERMABOND ADVANCED

## (undated) DEVICE — APPLICATOR CHLORAPREP ORN 26ML

## (undated) DEVICE — BAG TISS RETRV MONARCH 10MM